# Patient Record
Sex: FEMALE | Race: OTHER | URBAN - METROPOLITAN AREA
[De-identification: names, ages, dates, MRNs, and addresses within clinical notes are randomized per-mention and may not be internally consistent; named-entity substitution may affect disease eponyms.]

---

## 2018-09-13 ENCOUNTER — APPOINTMENT (RX ONLY)
Dept: URBAN - METROPOLITAN AREA CLINIC 20 | Facility: CLINIC | Age: 28
Setting detail: DERMATOLOGY
End: 2018-09-13

## 2018-09-13 DIAGNOSIS — L71.8 OTHER ROSACEA: ICD-10-CM

## 2018-09-13 DIAGNOSIS — L70.0 ACNE VULGARIS: ICD-10-CM

## 2018-09-13 DIAGNOSIS — D22 MELANOCYTIC NEVI: ICD-10-CM

## 2018-09-13 PROBLEM — D22.5 MELANOCYTIC NEVI OF TRUNK: Status: ACTIVE | Noted: 2018-09-13

## 2018-09-13 PROCEDURE — ? PRESCRIPTION

## 2018-09-13 PROCEDURE — ? COUNSELING

## 2018-09-13 PROCEDURE — ? OBSERVATION AND MEASURE

## 2018-09-13 PROCEDURE — ? ADDITIONAL NOTES

## 2018-09-13 PROCEDURE — 99203 OFFICE O/P NEW LOW 30 MIN: CPT

## 2018-09-13 RX ORDER — AZELAIC ACID 0.15 G/G
AEROSOL, FOAM TOPICAL
Qty: 1 | Refills: 3 | Status: ERX | COMMUNITY
Start: 2018-09-13

## 2018-09-13 RX ADMIN — AZELAIC ACID: 0.15 AEROSOL, FOAM TOPICAL at 00:00

## 2018-09-13 ASSESSMENT — LOCATION SIMPLE DESCRIPTION DERM
LOCATION SIMPLE: LEFT CHEEK
LOCATION SIMPLE: LEFT UPPER BACK
LOCATION SIMPLE: RIGHT CHEEK

## 2018-09-13 ASSESSMENT — LOCATION ZONE DERM
LOCATION ZONE: FACE
LOCATION ZONE: TRUNK

## 2018-09-13 ASSESSMENT — LOCATION DETAILED DESCRIPTION DERM
LOCATION DETAILED: LEFT SUPERIOR UPPER BACK
LOCATION DETAILED: LEFT INFERIOR LATERAL MALAR CHEEK
LOCATION DETAILED: RIGHT LATERAL MANDIBULAR CHEEK
LOCATION DETAILED: LEFT SUPERIOR CENTRAL BUCCAL CHEEK
LOCATION DETAILED: RIGHT SUPERIOR MEDIAL BUCCAL CHEEK

## 2018-09-13 NOTE — HPI: RASH
How Severe Is Your Rash?: moderate
Is This A New Presentation, Or A Follow-Up?: Rash
Additional History: Previously used after rash to help;  [place] apricot scrub (-) , dial soap (-) , New Skin wash and lotion (-) , Charcoal mask (worsened)

## 2018-09-13 NOTE — PROCEDURE: ADDITIONAL NOTES
Additional Notes: Plan:\\n-Discontinue OTC products\\n-Return proactive \\n-start oral solodyn 55mg once a day for 2 months. *Samples provided\\n-recommended epionce lytic cleanser
Detail Level: Simple

## 2018-09-13 NOTE — PROCEDURE: COUNSELING
Detail Level: Simple
Tazorac Counseling:  Patient advised that medication is irritating and drying.  Patient may need to apply sparingly and wash off after an hour before eventually leaving it on overnight.  The patient verbalized understanding of the proper use and possible adverse effects of tazorac.  All of the patient's questions and concerns were addressed.
Birth Control Pills Counseling: Birth Control Pill Counseling: I discussed with the patient the potential side effects of OCPs including but not limited to increased risk of stroke, heart attack, thrombophlebitis, deep venous thrombosis, hepatic adenomas, breast changes, GI upset, headaches, and depression.  The patient verbalized understanding of the proper use and possible adverse effects of OCPs. All of the patient's questions and concerns were addressed.
Topical Retinoid counseling:  Patient advised to apply a pea-sized amount only at bedtime and wait 30 minutes after washing their face before applying.  If too drying, patient may add a non-comedogenic moisturizer. The patient verbalized understanding of the proper use and possible adverse effects of retinoids.  All of the patient's questions and concerns were addressed.
Azithromycin Pregnancy And Lactation Text: This medication is considered safe during pregnancy and is also secreted in breast milk.
Include Pregnancy/Lactation Warning?: No
High Dose Vitamin A Pregnancy And Lactation Text: High dose vitamin A therapy is contraindicated during pregnancy and breast feeding.
Bactrim Counseling:  I discussed with the patient the risks of sulfa antibiotics including but not limited to GI upset, allergic reaction, drug rash, diarrhea, dizziness, photosensitivity, and yeast infections.  Rarely, more serious reactions can occur including but not limited to aplastic anemia, agranulocytosis, methemoglobinemia, blood dyscrasias, liver or kidney failure, lung infiltrates or desquamative/blistering drug rashes.
Erythromycin Counseling:  I discussed with the patient the risks of erythromycin including but not limited to GI upset, allergic reaction, drug rash, diarrhea, increase in liver enzymes, and yeast infections.
Benzoyl Peroxide Counseling: Patient counseled that medicine may cause skin irritation and bleach clothing.  In the event of skin irritation, the patient was advised to reduce the amount of the drug applied or use it less frequently.   The patient verbalized understanding of the proper use and possible adverse effects of benzoyl peroxide.  All of the patient's questions and concerns were addressed.
Spironolactone Counseling: Patient advised regarding risks of diarrhea, abdominal pain, hyperkalemia, birth defects (for female patients), liver toxicity and renal toxicity. The patient may need blood work to monitor liver and kidney function and potassium levels while on therapy. The patient verbalized understanding of the proper use and possible adverse effects of spironolactone.  All of the patient's questions and concerns were addressed.
Minocycline Counseling: Patient advised regarding possible photosensitivity and discoloration of the teeth, skin, lips, tongue and gums.  Patient instructed to avoid sunlight, if possible.  When exposed to sunlight, patients should wear protective clothing, sunglasses, and sunscreen.  The patient was instructed to call the office immediately if the following severe adverse effects occur:  hearing changes, easy bruising/bleeding, severe headache, or vision changes.  The patient verbalized understanding of the proper use and possible adverse effects of minocycline.  All of the patient's questions and concerns were addressed.
Azithromycin Counseling:  I discussed with the patient the risks of azithromycin including but not limited to GI upset, allergic reaction, drug rash, diarrhea, and yeast infections.
Doxycycline Pregnancy And Lactation Text: This medication is Pregnancy Category D and not consider safe during pregnancy. It is also excreted in breast milk but is considered safe for shorter treatment courses.
Dapsone Pregnancy And Lactation Text: This medication is Pregnancy Category C and is not considered safe during pregnancy or breast feeding.
Tazorac Pregnancy And Lactation Text: This medication is not safe during pregnancy. It is unknown if this medication is excreted in breast milk.
High Dose Vitamin A Counseling: Side effects reviewed, pt to contact office should one occur.
Doxycycline Counseling:  Patient counseled regarding possible photosensitivity and increased risk for sunburn.  Patient instructed to avoid sunlight, if possible.  When exposed to sunlight, patients should wear protective clothing, sunglasses, and sunscreen.  The patient was instructed to call the office immediately if the following severe adverse effects occur:  hearing changes, easy bruising/bleeding, severe headache, or vision changes.  The patient verbalized understanding of the proper use and possible adverse effects of doxycycline.  All of the patient's questions and concerns were addressed.
Topical Retinoid Pregnancy And Lactation Text: This medication is Pregnancy Category C. It is unknown if this medication is excreted in breast milk.
Isotretinoin Counseling: Patient should get monthly blood tests, not donate blood, not drive at night if vision affected, not share medication, and not undergo elective surgery for 6 months after tx completed. Side effects reviewed, pt to contact office should one occur.
Birth Control Pills Pregnancy And Lactation Text: This medication should be avoided if pregnant and for the first 30 days post-partum.
Isotretinoin Pregnancy And Lactation Text: This medication is Pregnancy Category X and is considered extremely dangerous during pregnancy. It is unknown if it is excreted in breast milk.
Topical Clindamycin Counseling: Patient counseled that this medication may cause skin irritation or allergic reactions.  In the event of skin irritation, the patient was advised to reduce the amount of the drug applied or use it less frequently.   The patient verbalized understanding of the proper use and possible adverse effects of clindamycin.  All of the patient's questions and concerns were addressed.
Bactrim Pregnancy And Lactation Text: This medication is Pregnancy Category D and is known to cause fetal risk.  It is also excreted in breast milk.
Erythromycin Pregnancy And Lactation Text: This medication is Pregnancy Category B and is considered safe during pregnancy. It is also excreted in breast milk.
Spironolactone Pregnancy And Lactation Text: This medication can cause feminization of the male fetus and should be avoided during pregnancy. The active metabolite is also found in breast milk.
Tetracycline Pregnancy And Lactation Text: This medication is Pregnancy Category D and not consider safe during pregnancy. It is also excreted in breast milk.
Topical Clindamycin Pregnancy And Lactation Text: This medication is Pregnancy Category B and is considered safe during pregnancy. It is unknown if it is excreted in breast milk.
Dapsone Counseling: I discussed with the patient the risks of dapsone including but not limited to hemolytic anemia, agranulocytosis, rashes, methemoglobinemia, kidney failure, peripheral neuropathy, headaches, GI upset, and liver toxicity.  Patients who start dapsone require monitoring including baseline LFTs and weekly CBCs for the first month, then every month thereafter.  The patient verbalized understanding of the proper use and possible adverse effects of dapsone.  All of the patient's questions and concerns were addressed.
Tetracycline Counseling: Patient counseled regarding possible photosensitivity and increased risk for sunburn.  Patient instructed to avoid sunlight, if possible.  When exposed to sunlight, patients should wear protective clothing, sunglasses, and sunscreen.  The patient was instructed to call the office immediately if the following severe adverse effects occur:  hearing changes, easy bruising/bleeding, severe headache, or vision changes.  The patient verbalized understanding of the proper use and possible adverse effects of tetracycline.  All of the patient's questions and concerns were addressed. Patient understands to avoid pregnancy while on therapy due to potential birth defects.
Topical Sulfur Applications Counseling: Topical Sulfur Counseling: Patient counseled that this medication may cause skin irritation or allergic reactions.  In the event of skin irritation, the patient was advised to reduce the amount of the drug applied or use it less frequently.   The patient verbalized understanding of the proper use and possible adverse effects of topical sulfur application.  All of the patient's questions and concerns were addressed.
Topical Sulfur Applications Pregnancy And Lactation Text: This medication is Pregnancy Category C and has an unknown safety profile during pregnancy. It is unknown if this topical medication is excreted in breast milk.
Benzoyl Peroxide Pregnancy And Lactation Text: This medication is Pregnancy Category C. It is unknown if benzoyl peroxide is excreted in breast milk.

## 2018-12-13 ENCOUNTER — APPOINTMENT (RX ONLY)
Dept: URBAN - METROPOLITAN AREA CLINIC 20 | Facility: CLINIC | Age: 28
Setting detail: DERMATOLOGY
End: 2018-12-13

## 2020-12-31 LAB
ABO GROUP BLD: NORMAL
BLD GP AB SCN SERPL QL: NORMAL
C TRACH DNA GENITAL QL NAA+PROBE: NORMAL
GLUCOSE 1H P 50 G GLC PO SERPL-MCNC: 124 MG/DL
HBV SURFACE AG SERPL QL IA: NORMAL
HIV 1+2 AB+HIV1 P24 AG SERPL QL IA: NORMAL
N GONORRHOEA DNA GENITAL QL NAA+PROBE: NORMAL
RUBV IGG SERPL IA-ACNC: NORMAL
TREPONEMA PALLIDUM IGG+IGM AB [PRESENCE] IN SERUM OR PLASMA BY IMMUNOASSAY: NORMAL

## 2021-05-17 ENCOUNTER — HOSPITAL ENCOUNTER (EMERGENCY)
Facility: MEDICAL CENTER | Age: 31
End: 2021-05-17
Attending: OBSTETRICS & GYNECOLOGY | Admitting: OBSTETRICS & GYNECOLOGY
Payer: COMMERCIAL

## 2021-05-17 ENCOUNTER — APPOINTMENT (OUTPATIENT)
Dept: RADIOLOGY | Facility: MEDICAL CENTER | Age: 31
End: 2021-05-17
Attending: OBSTETRICS & GYNECOLOGY
Payer: COMMERCIAL

## 2021-05-17 VITALS
WEIGHT: 175 LBS | HEIGHT: 68 IN | OXYGEN SATURATION: 97 % | RESPIRATION RATE: 18 BRPM | BODY MASS INDEX: 26.52 KG/M2 | TEMPERATURE: 97.9 F | HEART RATE: 99 BPM | SYSTOLIC BLOOD PRESSURE: 109 MMHG | DIASTOLIC BLOOD PRESSURE: 68 MMHG

## 2021-05-17 PROCEDURE — 302449 STATCHG TRIAGE ONLY (STATISTIC)

## 2021-05-17 PROCEDURE — 59025 FETAL NON-STRESS TEST: CPT

## 2021-05-17 PROCEDURE — 76815 OB US LIMITED FETUS(S): CPT

## 2021-05-17 NOTE — DISCHARGE INSTRUCTIONS
Activity Restriction During Pregnancy  Your health care provider may recommend specific activity restrictions during pregnancy for a variety of reasons. Activity restriction may require that you limit activities that require great effort, such as exercise, lifting, or sex.  The type of activity restriction will vary for each person, depending on your risk or the problems you are having. Activity restriction may be recommended for a period of time until your baby is delivered.  Why are activity restrictions recommended?  Activity restriction may be recommended if:  · Your placenta is partially or completely covering the opening of your cervix (placenta previa).  · There is bleeding between the wall of the uterus and the amniotic sac in the first trimester of pregnancy (subchorionic hemorrhage).  · You went into labor too early ( labor).  · You have a history of miscarriage.  · You have a condition that causes high blood pressure during pregnancy (preeclampsia or eclampsia).  · You are pregnant with more than one baby.  · Your baby is not growing well.  What are the risks?  The risks depend on your specific restriction. Strict bed rest has the most physical and emotional risks and is no longer routinely recommended. Risks of strict bed rest include:  · Loss of muscle conditioning from not moving.  · Blood clots.  · Social isolation.  · Depression.  · Loss of income.  Talk with your health care team about activity restriction to decide if it is best for you and your baby. Even if you are having problems during your pregnancy, you may be able to continue with normal levels of activity with careful monitoring by your health care team.  Follow these instructions at home:  If needed, based on your overall health and the health of your baby, your health care provider will decide which type of activity restriction is right for you. Activity restrictions may include:  · Not lifting anything heavier than 10 pounds (4.5  kg).  · Avoiding activities that take a lot of physical effort.  · No lifting or straining.  · Resting in a sitting position or lying down for periods of time during the day.  Pelvic rest may be recommended along with activity restrictions. If pelvic rest is recommended, then:  · Do not have sex, an orgasm, or use sexual stimulation.  · Do not use tampons. Do not douche. Do not put anything into your vagina.  · Do not lift anything that is heavier than 10 lb (4.5 kg).  · Avoid activities that require a lot of effort.  · Avoid any activity in which your pelvic muscles could become strained, such as squatting.  Questions to ask your health care provider  · Why is my activity being limited?  · How will activity restrictions affect my body?  · Why is rest helpful for me and my baby?  · What activities can I do?  · When can I return to normal activities?  When should I seek immediate medical care?  Seek immediate medical care if you have:  · Vaginal bleeding.  · Vaginal discharge.  · Cramping pain in your lower abdomen.  · Regular contractions.  · A low, dull backache.  Summary  · Your health care provider may recommend specific activity restrictions during pregnancy for a variety of reasons.  · Activity restriction may require that you limit activities such as exercise, lifting, sex, or any other activity that requires great effort.  · Discuss the risks and benefits of activity restriction with your health care team to decide if it is best for you and your baby.  · Contact your health care provider right away if you think you are having contractions, or if you notice vaginal bleeding, discharge, or cramping.  This information is not intended to replace advice given to you by your health care provider. Make sure you discuss any questions you have with your health care provider.  Document Released: 04/13/2012 Document Revised: 04/09/2019 Document Reviewed: 04/09/2019  Elsevier Patient Education © 2020 Elsevier  Inc[employer]   Labor and Birth Information  Pregnancy normally lasts 39-41 weeks.  labor is when labor starts early. It starts before you have been pregnant for 37 whole weeks.  What are the risk factors for  labor?   labor is more likely to occur in women who:  · Have an infection while pregnant.  · Have a cervix that is short.  · Have gone into  labor before.  · Have had surgery on their cervix.  · Are younger than age 17.  · Are older than age 35.  · Are .  · Are pregnant with two or more babies.  · Take street drugs while pregnant.  · Smoke while pregnant.  · Do not gain enough weight while pregnant.  · Got pregnant right after another pregnancy.  What are the symptoms of  labor?  Symptoms of  labor include:  · Cramps. The cramps may feel like the cramps some women get during their period. The cramps may happen with watery poop (diarrhea).  · Pain in the belly (abdomen).  · Pain in the lower back.  · Regular contractions or tightening. It may feel like your belly is getting tighter.  · Pressure in the lower belly that seems to get stronger.  · More fluid (discharge) leaking from the vagina. The fluid may be watery or bloody.  · Water breaking.  Why is it important to notice signs of  labor?  Babies who are born early may not be fully developed. They have a higher chance for:  · Long-term heart problems.  · Long-term lung problems.  · Trouble controlling body systems, like breathing.  · Bleeding in the brain.  · A condition called cerebral palsy.  · Learning difficulties.  · Death.  These risks are highest for babies who are born before 34 weeks of pregnancy.  How is  labor treated?  Treatment depends on:  · How long you were pregnant.  · Your condition.  · The health of your baby.  Treatment may involve:  · Having a stitch (suture) placed in your cervix. When you give birth, your cervix opens so the baby can come out. The stitch keeps the  cervix from opening too soon.  · Staying at the hospital.  · Taking or getting medicines, such as:  ? Hormone medicines.  ? Medicines to stop contractions.  ? Medicines to help the baby’s lungs develop.  ? Medicines to prevent your baby from having cerebral palsy.  What should I do if I am in  labor?  If you think you are going into labor too soon, call your doctor right away.  How can I prevent  labor?  · Do not use any tobacco products.  ? Examples of these are cigarettes, chewing tobacco, and e-cigarettes.  ? If you need help quitting, ask your doctor.  · Do not use street drugs.  · Do not use any medicines unless you ask your doctor if they are safe for you.  · Talk with your doctor before taking any herbal supplements.  · Make sure you gain enough weight.  · Watch for infection. If you think you might have an infection, get it checked right away.  · If you have gone into  labor before, tell your doctor.  This information is not intended to replace advice given to you by your health care provider. Make sure you discuss any questions you have with your health care provider.  Document Released: 2010 Document Revised: 04/10/2020 Document Reviewed: 05/10/2017  Elsevier Patient Education ©  Elsevier Inc.

## 2021-05-17 NOTE — PROGRESS NOTES
Report from Kathryn Galdamez. POC discussed, assumed pt care.     33.0 here for vaginal bleeding, post intercourse.   Report to Dr. Castro. Order for US with BHASKAR.   US here. BHASKAR 16 cm. Baby vertex, fundal placenta. Cervical length 4.7 cm cervix closed.    Bleeding has stopped since being here at the hospital. REport to Dr. Castro. Discharge order received. Pt has f/u appointment with Matthew on Wednesday. Pelvic rest until cleared by MD.Discharge teaching performed. PT and FOB verbalized understanding. Discharged to home, ambulatory.

## 2021-05-17 NOTE — PROGRESS NOTES
0630 - 31 y/o  EDC 21, EGA 33.0, here to LDA 3 with , Brady. C/O vaginal bleeding around 0500 this morning following intercourse early this morning. Initially she discovered blood on the sheets, then had multiple golf ball sized clots dislodged, now she is having watery bloody discharge. She reports having mild cramping for a short period of time following the bleed but this has subsided. EFM/TOCO applied, Patient states positive FM. VSS.  0700 - Report given to MATEO Barros RN. All questions answered.

## 2021-06-05 LAB — GP B STREP DNA SPEC QL NAA+PROBE: NORMAL

## 2021-07-03 ENCOUNTER — HOSPITAL ENCOUNTER (OUTPATIENT)
Dept: OBGYN | Facility: MEDICAL CENTER | Age: 31
End: 2021-07-03
Attending: OBSTETRICS & GYNECOLOGY
Payer: COMMERCIAL

## 2021-07-03 PROCEDURE — U0003 INFECTIOUS AGENT DETECTION BY NUCLEIC ACID (DNA OR RNA); SEVERE ACUTE RESPIRATORY SYNDROME CORONAVIRUS 2 (SARS-COV-2) (CORONAVIRUS DISEASE [COVID-19]), AMPLIFIED PROBE TECHNIQUE, MAKING USE OF HIGH THROUGHPUT TECHNOLOGIES AS DESCRIBED BY CMS-2020-01-R: HCPCS

## 2021-07-03 PROCEDURE — U0005 INFEC AGEN DETEC AMPLI PROBE: HCPCS

## 2021-07-03 NOTE — PROGRESS NOTES
1209- COVID swab collected.  Self isolation precautions given.  Pt home ambulatory in stable condition with FOB at side.

## 2021-07-04 LAB
SARS-COV-2 RNA RESP QL NAA+PROBE: NOTDETECTED
SPECIMEN SOURCE: NORMAL

## 2021-07-05 ENCOUNTER — HOSPITAL ENCOUNTER (INPATIENT)
Facility: MEDICAL CENTER | Age: 31
LOS: 1 days | End: 2021-07-06
Attending: OBSTETRICS & GYNECOLOGY | Admitting: OBSTETRICS & GYNECOLOGY
Payer: COMMERCIAL

## 2021-07-05 LAB
BASOPHILS # BLD AUTO: 0.3 % (ref 0–1.8)
BASOPHILS # BLD: 0.03 K/UL (ref 0–0.12)
EOSINOPHIL # BLD AUTO: 0.07 K/UL (ref 0–0.51)
EOSINOPHIL NFR BLD: 0.7 % (ref 0–6.9)
ERYTHROCYTE [DISTWIDTH] IN BLOOD BY AUTOMATED COUNT: 45.9 FL (ref 35.9–50)
ERYTHROCYTE [DISTWIDTH] IN BLOOD BY AUTOMATED COUNT: 46.7 FL (ref 35.9–50)
HCT VFR BLD AUTO: 31.1 % (ref 37–47)
HCT VFR BLD AUTO: 33.2 % (ref 37–47)
HGB BLD-MCNC: 10 G/DL (ref 12–16)
HGB BLD-MCNC: 10.9 G/DL (ref 12–16)
HOLDING TUBE BB 8507: NORMAL
IMM GRANULOCYTES # BLD AUTO: 0.06 K/UL (ref 0–0.11)
IMM GRANULOCYTES NFR BLD AUTO: 0.6 % (ref 0–0.9)
LYMPHOCYTES # BLD AUTO: 1.81 K/UL (ref 1–4.8)
LYMPHOCYTES NFR BLD: 19 % (ref 22–41)
MCH RBC QN AUTO: 30.3 PG (ref 27–33)
MCH RBC QN AUTO: 31.1 PG (ref 27–33)
MCHC RBC AUTO-ENTMCNC: 32.2 G/DL (ref 33.6–35)
MCHC RBC AUTO-ENTMCNC: 32.8 G/DL (ref 33.6–35)
MCV RBC AUTO: 94.2 FL (ref 81.4–97.8)
MCV RBC AUTO: 94.9 FL (ref 81.4–97.8)
MONOCYTES # BLD AUTO: 0.55 K/UL (ref 0–0.85)
MONOCYTES NFR BLD AUTO: 5.8 % (ref 0–13.4)
NEUTROPHILS # BLD AUTO: 7.03 K/UL (ref 2–7.15)
NEUTROPHILS NFR BLD: 73.6 % (ref 44–72)
NRBC # BLD AUTO: 0 K/UL
NRBC BLD-RTO: 0 /100 WBC
NUMBER OF RH DOSES IND 8505RD: NORMAL
PLATELET # BLD AUTO: 193 K/UL (ref 164–446)
PLATELET # BLD AUTO: 198 K/UL (ref 164–446)
PMV BLD AUTO: 10.9 FL (ref 9–12.9)
PMV BLD AUTO: 11.2 FL (ref 9–12.9)
RBC # BLD AUTO: 3.3 M/UL (ref 4.2–5.4)
RBC # BLD AUTO: 3.5 M/UL (ref 4.2–5.4)
RH BLD: NORMAL
WBC # BLD AUTO: 14.9 K/UL (ref 4.8–10.8)
WBC # BLD AUTO: 9.6 K/UL (ref 4.8–10.8)

## 2021-07-05 PROCEDURE — 36415 COLL VENOUS BLD VENIPUNCTURE: CPT

## 2021-07-05 PROCEDURE — 304965 HCHG RECOVERY SERVICES

## 2021-07-05 PROCEDURE — 0UQMXZZ REPAIR VULVA, EXTERNAL APPROACH: ICD-10-PCS | Performed by: OBSTETRICS & GYNECOLOGY

## 2021-07-05 PROCEDURE — 770002 HCHG ROOM/CARE - OB PRIVATE (112)

## 2021-07-05 PROCEDURE — 700111 HCHG RX REV CODE 636 W/ 250 OVERRIDE (IP): Performed by: OBSTETRICS & GYNECOLOGY

## 2021-07-05 PROCEDURE — 700105 HCHG RX REV CODE 258: Performed by: OBSTETRICS & GYNECOLOGY

## 2021-07-05 PROCEDURE — 85025 COMPLETE CBC W/AUTO DIFF WBC: CPT

## 2021-07-05 PROCEDURE — 85027 COMPLETE CBC AUTOMATED: CPT

## 2021-07-05 PROCEDURE — 0HQ9XZZ REPAIR PERINEUM SKIN, EXTERNAL APPROACH: ICD-10-PCS | Performed by: OBSTETRICS & GYNECOLOGY

## 2021-07-05 PROCEDURE — 59409 OBSTETRICAL CARE: CPT

## 2021-07-05 PROCEDURE — 86901 BLOOD TYPING SEROLOGIC RH(D): CPT

## 2021-07-05 PROCEDURE — A9270 NON-COVERED ITEM OR SERVICE: HCPCS | Performed by: OBSTETRICS & GYNECOLOGY

## 2021-07-05 PROCEDURE — 700102 HCHG RX REV CODE 250 W/ 637 OVERRIDE(OP): Performed by: OBSTETRICS & GYNECOLOGY

## 2021-07-05 RX ORDER — SODIUM CHLORIDE, SODIUM LACTATE, POTASSIUM CHLORIDE, CALCIUM CHLORIDE 600; 310; 30; 20 MG/100ML; MG/100ML; MG/100ML; MG/100ML
INJECTION, SOLUTION INTRAVENOUS CONTINUOUS
Status: DISCONTINUED | OUTPATIENT
Start: 2021-07-05 | End: 2021-07-06 | Stop reason: HOSPADM

## 2021-07-05 RX ORDER — ONDANSETRON 4 MG/1
4 TABLET, ORALLY DISINTEGRATING ORAL EVERY 6 HOURS PRN
Status: DISCONTINUED | OUTPATIENT
Start: 2021-07-05 | End: 2021-07-06 | Stop reason: HOSPADM

## 2021-07-05 RX ORDER — MISOPROSTOL 200 UG/1
800 TABLET ORAL
Status: DISCONTINUED | OUTPATIENT
Start: 2021-07-05 | End: 2021-07-06 | Stop reason: HOSPADM

## 2021-07-05 RX ORDER — METHYLERGONOVINE MALEATE 0.2 MG/ML
0.2 INJECTION INTRAVENOUS
Status: DISCONTINUED | OUTPATIENT
Start: 2021-07-05 | End: 2021-07-06 | Stop reason: HOSPADM

## 2021-07-05 RX ORDER — ONDANSETRON 2 MG/ML
4 INJECTION INTRAMUSCULAR; INTRAVENOUS EVERY 6 HOURS PRN
Status: DISCONTINUED | OUTPATIENT
Start: 2021-07-05 | End: 2021-07-06 | Stop reason: HOSPADM

## 2021-07-05 RX ORDER — MISOPROSTOL 200 UG/1
1000 TABLET ORAL
Status: DISCONTINUED | OUTPATIENT
Start: 2021-07-05 | End: 2021-07-05 | Stop reason: HOSPADM

## 2021-07-05 RX ORDER — IBUPROFEN 600 MG/1
600 TABLET ORAL EVERY 6 HOURS PRN
Status: DISCONTINUED | OUTPATIENT
Start: 2021-07-05 | End: 2021-07-06 | Stop reason: HOSPADM

## 2021-07-05 RX ORDER — SODIUM CHLORIDE, SODIUM LACTATE, POTASSIUM CHLORIDE, CALCIUM CHLORIDE 600; 310; 30; 20 MG/100ML; MG/100ML; MG/100ML; MG/100ML
INJECTION, SOLUTION INTRAVENOUS PRN
Status: DISCONTINUED | OUTPATIENT
Start: 2021-07-05 | End: 2021-07-06 | Stop reason: HOSPADM

## 2021-07-05 RX ORDER — LIDOCAINE HYDROCHLORIDE 10 MG/ML
INJECTION, SOLUTION INFILTRATION; PERINEURAL
Status: ACTIVE
Start: 2021-07-05 | End: 2021-07-06

## 2021-07-05 RX ORDER — ACETAMINOPHEN 325 MG/1
325 TABLET ORAL EVERY 4 HOURS PRN
Status: DISCONTINUED | OUTPATIENT
Start: 2021-07-05 | End: 2021-07-06 | Stop reason: HOSPADM

## 2021-07-05 RX ORDER — OXYCODONE HYDROCHLORIDE AND ACETAMINOPHEN 5; 325 MG/1; MG/1
2 TABLET ORAL EVERY 4 HOURS PRN
Status: DISCONTINUED | OUTPATIENT
Start: 2021-07-05 | End: 2021-07-06 | Stop reason: HOSPADM

## 2021-07-05 RX ORDER — DOCUSATE SODIUM 100 MG/1
100 CAPSULE, LIQUID FILLED ORAL 2 TIMES DAILY PRN
Status: DISCONTINUED | OUTPATIENT
Start: 2021-07-05 | End: 2021-07-06 | Stop reason: HOSPADM

## 2021-07-05 RX ORDER — VITAMIN A ACETATE, BETA CAROTENE, ASCORBIC ACID, CHOLECALCIFEROL, .ALPHA.-TOCOPHEROL ACETATE, DL-, THIAMINE MONONITRATE, RIBOFLAVIN, NIACINAMIDE, PYRIDOXINE HYDROCHLORIDE, FOLIC ACID, CYANOCOBALAMIN, CALCIUM CARBONATE, FERROUS FUMARATE, ZINC OXIDE, CUPRIC OXIDE 3080; 12; 120; 400; 1; 1.84; 3; 20; 22; 920; 25; 200; 27; 10; 2 [IU]/1; UG/1; MG/1; [IU]/1; MG/1; MG/1; MG/1; MG/1; MG/1; [IU]/1; MG/1; MG/1; MG/1; MG/1; MG/1
1 TABLET, FILM COATED ORAL
Status: DISCONTINUED | OUTPATIENT
Start: 2021-07-05 | End: 2021-07-06 | Stop reason: HOSPADM

## 2021-07-05 RX ADMIN — IBUPROFEN 600 MG: 600 TABLET, FILM COATED ORAL at 17:34

## 2021-07-05 RX ADMIN — ONDANSETRON 4 MG: 2 INJECTION INTRAMUSCULAR; INTRAVENOUS at 11:13

## 2021-07-05 RX ADMIN — OXYTOCIN 2 MILLI-UNITS/MIN: 10 INJECTION, SOLUTION INTRAMUSCULAR; INTRAVENOUS at 05:23

## 2021-07-05 RX ADMIN — SODIUM CHLORIDE, POTASSIUM CHLORIDE, SODIUM LACTATE AND CALCIUM CHLORIDE: 600; 310; 30; 20 INJECTION, SOLUTION INTRAVENOUS at 05:23

## 2021-07-05 RX ADMIN — OXYTOCIN 125 ML/HR: 10 INJECTION, SOLUTION INTRAMUSCULAR; INTRAVENOUS at 14:14

## 2021-07-05 ASSESSMENT — PATIENT HEALTH QUESTIONNAIRE - PHQ9
SUM OF ALL RESPONSES TO PHQ QUESTIONS 1-9: 6
7. TROUBLE CONCENTRATING ON THINGS, SUCH AS READING THE NEWSPAPER OR WATCHING TELEVISION: NOT AT ALL
9. THOUGHTS THAT YOU WOULD BE BETTER OFF DEAD, OR OF HURTING YOURSELF: NOT AT ALL
4. FEELING TIRED OR HAVING LITTLE ENERGY: MORE THAN HALF THE DAYS
8. MOVING OR SPEAKING SO SLOWLY THAT OTHER PEOPLE COULD HAVE NOTICED. OR THE OPPOSITE, BEING SO FIGETY OR RESTLESS THAT YOU HAVE BEEN MOVING AROUND A LOT MORE THAN USUAL: NOT AT ALL
3. TROUBLE FALLING OR STAYING ASLEEP OR SLEEPING TOO MUCH: NEARLY EVERY DAY
5. POOR APPETITE OR OVEREATING: NOT AT ALL
1. LITTLE INTEREST OR PLEASURE IN DOING THINGS: SEVERAL DAYS
SUM OF ALL RESPONSES TO PHQ9 QUESTIONS 1 AND 2: 1
6. FEELING BAD ABOUT YOURSELF - OR THAT YOU ARE A FAILURE OR HAVE LET YOURSELF OR YOUR FAMILY DOWN: NOT AL ALL
2. FEELING DOWN, DEPRESSED, IRRITABLE, OR HOPELESS: NOT AT ALL

## 2021-07-05 ASSESSMENT — PAIN DESCRIPTION - PAIN TYPE
TYPE: ACUTE PAIN

## 2021-07-05 ASSESSMENT — COPD QUESTIONNAIRES
DO YOU EVER COUGH UP ANY MUCUS OR PHLEGM?: NO/ONLY WITH OCCASIONAL COLDS OR INFECTIONS
COPD SCREENING SCORE: 0
DURING THE PAST 4 WEEKS HOW MUCH DID YOU FEEL SHORT OF BREATH: NONE/LITTLE OF THE TIME
IN THE PAST 12 MONTHS DO YOU DO LESS THAN YOU USED TO BECAUSE OF YOUR BREATHING PROBLEMS: DISAGREE/UNSURE
HAVE YOU SMOKED AT LEAST 100 CIGARETTES IN YOUR ENTIRE LIFE: NO/DON'T KNOW

## 2021-07-05 ASSESSMENT — LIFESTYLE VARIABLES
DOES PATIENT WANT TO STOP DRINKING: NO
ALCOHOL_USE: NO
EVER_SMOKED: NEVER

## 2021-07-05 NOTE — PROGRESS NOTES
"0700 - Report received, care assumed. Harrell Gestation today at 40.0 Weeks      Patient is in bed sleeping, awake briefly to RN at the BS. FOB \"Lizandro\" at BS.     Patient would like the FOB at BS during the pushing and delivery phase.  Patient would like immediate skin/skin at delivery.   Once the cord has stopped pulsating, the FOB would like to cut the cord.   Planning to breastfeed with DBM if necessary for supplementation    Reports little sleep last night; SROM at 2330 then admission to the hospital.     Denies ill feeling, reports FM. No sudden change to vision/edema/HA; states she has been getting up to the BR herself without assist, denies dizziness or weakness.     Use of FM/[place] discussed and in place, discussed POC - ongoing as needed. Pleasant/sleepy affect/mood. Review of labor process/expectations, breathing/relaxation techniques - TBD as needed. Discussed pain management options - patient is interested in labor and delivery without pain medications. Reports her last delivery was unmediated as well, albeit precipitous.      Patient denies questions/concerns regarding care since arrival to [facility].   RN contact information updated on the dry erase board, discussed.   Patient encouraged to call RN with all questions/concerns/needs.  "

## 2021-07-05 NOTE — H&P
DATE OF ADMISSION:  2021     ADMISSION DIAGNOSES:  1.  Intrauterine pregnancy at 40+0 weeks' gestation.  2.  S-ROM clear fluid on 2021 at 2300 hours.  3.  GBS negative.  4.  Rh negative.  5.  History of precipitous delivery 9 years ago.  6.  Active labor.     HISTORY OF PRESENT ILLNESS:  The patient is a 30-year-old  3, para   1-0-1-1 at 40+0 weeks' gestation based upon her LMP of 2020 which is   consistent with a 9+0 week ultrasound performed on 2020 who presents to   labor and delivery with report of spontaneous rupture of membranes of clear   fluid on 2021 at 2300 hours.  At the time of admission, she was noted to   be ruptured with clear fluid.  Her cervix was 4 cm, 80% effaced, -1 station.    The patient was jesus irregularly.  Category 1 tracing.     Prenatal care with Dr. Angelita Castro, first visit at 9 weeks' gestation,   total visits 13, total weight gain 32 pounds.  Third trimester blood pressures   129-142/68-77.     PRENATAL LABS:  GBS negative on 2021.  One-hour , RPR   nonreactive.  Blood type A negative, antibody screen negative, rubella immune,   hepatitis B surface antigen negative, HIV negative, urine culture negative.    Pap negative for intraepithelial lesion or malignancy.  GC chlamydia negative,   negative.     OBSTETRIC ULTRASOUND:  1.  On 2020 at 9 weeks' gestation, REY 2021.  2.  On 2020 at 21+0 weeks' gestation, REY 2021, breech   presentation, posterior placenta, no previa, normal BHASKAR.  Female fetus, size   equal dates.  Cervix 3.6 cm.     PAST OBSTETRIC HISTORY:   1.  On 2012 at 40 weeks' gestation, 7 pound 3 ounce female infant via    precipitous delivery.  2.  In , 6 weeks' gestation medical termination.     PAST SURGICAL HISTORY:  Right hip surgery, wisdom tooth extraction.     PAST MEDICAL HISTORY:  Rh negative celiac disease.     ALLERGIES:  No known drug allergies.     MEDICATIONS:   Prenatal vitamins.     SOCIAL HISTORY:  She is .  She denies alcohol, tobacco, or drugs of   abuse.     PHYSICAL EXAMINATION:    VITAL SIGNS:  Stable.  She is afebrile.  GENERAL:  Alert, awake, and oriented x3, in no acute distress.  ABDOMEN:  Gravid, vertex by Leopold's, estimated fetal weight 3200 grams.    [facility], she is jesus irregularly, external fetal monitoring category 1   tracing, reactive, without decels.  PELVIC:  Sterile vaginal exam on admission 4 cm, 80% effaced, -2 station.     LABORATORY DATA:  On admission are pending.     ASSESSMENT AND PLAN:  A 30-year-old  3, para 1-0-1-1, at 40+0 weeks'   gestation based upon her LMP, which is consistent with a 9-week ultrasound,   who presents to labor and delivery with spontaneous rupture of membranes of   clear fluid at 2300 hours on 2021 who is in active labor, GBS negative   and Rh negative.     PLAN:  The patient will be admitted to labor and delivery.  She may have an   epidural for labor analgesia.  We will start IV Pitocin per protocol for labor   augmentation and anticipate a normal spontaneous vaginal delivery.        ______________________________  Sumaya Oh MD    HTA/LETICIA    DD:  2021 11:23  DT:  2021 12:00    Job#:  [id number]    CC:BRANDON HOPE MD

## 2021-07-05 NOTE — PROGRESS NOTES
TRANSFERRED FROM L&D TO POSTPARTUM VIA WHEELCHAIR. AMBULATES WITH STEADY GAIT. FUNDUS firm @u/1  LOCHIA light rubra. IVpatent left arm without redness or swelling. ORIENTED TO UNIT PROCEDURES AND INFANT SAFETY. PAIN . ENC TO CALL WITH NEEDS

## 2021-07-05 NOTE — CARE PLAN
The patient is Stable - Low risk of patient condition declining or worsening         Progress made toward(s) clinical / shift goals:    Problem: Risk for Injury  Goal: Patient and fetus will be free of preventable injury/complications  Outcome: Progressing  Note: Pt. And fetus to remain injury free during admission. Safety protocols discussed with pt. And family. Pt. And family agree to safety protocols at this time.      Problem: Pain  Goal: Patient's pain will be alleviated or reduced to the patient’s comfort goal  Outcome: Progressing  Note: Pt. Goal for pain to maintain at manageable level during admission. Pt. Desires unmedicated delivery. Coping techniques discussed with pt. And family.

## 2021-07-05 NOTE — FLOWSHEET NOTE
0045: Pt. Presents to labor and delivery with reports of SROM at home at 2330 with large amounts of clear fluid. Hx reviewed. RN visualizes amniotic fluid on chux pad and perineum.   0101: MD Loan contacted via telephone. Report given. Admission orders received. MD reports she will present to facility.   0253: Pt. Ambulating hallway with FOB at side   0302: Pt returned to labor room. Pt. Laboring on birth ball at BS.   0700: Report given to ROSALIND Sibley. POC discussed, all cares relinquished at this time.

## 2021-07-05 NOTE — L&D DELIVERY NOTE
DATE OF SERVICE:  2021     PROCEDURES:    1.  Spontaneous vaginal delivery.  2.  Repair of first-degree perineal laceration and left periurethral   laceration.     OBSTETRICIAN:  Johan Lopez MD     DIAGNOSES:    1.  A 40-week gestation, delivered.  2.  Prelabor rupture of membranes.  3.  Labor.     COMPLICATIONS:  None.     ESTIMATED BLOOD LOSS:  200 mL     FINDINGS:  Baby--- female, one minute Apgar 8, five minute Apgar 9.  Weight---3505 grams     BRIEF HISTORY:  This 30-year-old lady is now .  She presented on her due   date with prelabor rupture of membranes and subsequent spontaneous onset of   contractions.  She was 4 cm dilated on admission.  The amniotic fluid was   clear.  Fetal monitoring was reassuring.  She did not request epidural.  She   progressed appropriately.  Second stage was very short.  She only pushed one   time.  She pushed the baby out occiput anterior with no episiotomy.  I bulb   suctioned the baby's mouth and nares.  There were no nuchal cords.  The   shoulders and body delivered easily.  I placed the baby on her chest and 3   minutes later, I doubly clamped and cut the cord.  The placenta and all   attached membranes delivered spontaneously in a timely fashion.  There was a   3-vessel cord with central insertion.  The patient sustained a small midline   first-degree perineal laceration and a left periurethral laceration.  The   urethra was intact.  1% lidocaine was administered locally.  The wounds were   irrigated with Betadine solution.  I closed both wounds with 3-0 Vicryl.  The   first-degree wound was closed with running suture and the left periurethral   laceration was closed with a single suture.  Sponge and needle counts were   correct.        ______________________________  MD TEX Boyd/KRDANIEL    DD:  2021 13:28  DT:  2021 13:43    Job#:  [id number]    CC:BRANDON HOPE MD

## 2021-07-05 NOTE — L&D DELIVERY NOTE
(dictated)    Very short 2nd stage (one push)  Baby: female, APGARs 8-9, not weighed yet  plac spont,  cc  No epis  Lac: 1st degree perineal, L periurethral, 1% lidocaine, 3-0 vicryl    PROM latency 14 hrs, afebrile, GBS neg

## 2021-07-05 NOTE — H&P
"ADMISSION H AND P (DICTATED)    ADMISSION DIAGNOSIS:    1.  IUP AT 40W0D.  2.  SROM clear fluid at 2330 on 2021.  3.  GBS negative.  4.  Active labor.    PLAN:    1.  Admit to L and D.  2.  Will start IV pitocin for labor augmentation.  3.  Ok for epidural.  4.  Anticipate .    Recent Labs     21  0135   WBC 9.6   RBC 3.50*   HEMOGLOBIN 10.9*   HEMATOCRIT 33.2*   MCV 94.9   MCH 31.1   RDW 46.7   PLATELETCT 198   MPV 10.9   NEUTSPOLYS 73.60*   LYMPHOCYTES 19.00*   MONOCYTES 5.80   EOSINOPHILS 0.70   BASOPHILS 0.30     /70   Pulse 83   Temp 36.3 °C (97.4 °F) (Temporal)   Resp 16   Ht 1.727 m (5' 8\")   Wt 85.3 kg (188 lb)     A, A, and O x 3 NAD    Gravid    Vertex    EFW: 3200 grams\    TOCO: every 3-5 minutes    EFM: category I tracing.  Reactive and without decelerations.    A/P: 29 yo  at 40w0d who presents with SROM clear fluid and who is GBS negative and in active labor.    1.  Admit to L and D.  2.  Will start IV pitocin for labor augmentation.  3.  Ok for epidural.  4.  Anticipate .  5.  History of precipitous delivery.  6.  Will sign out to Dr. Lopez for continued care of this patient.    "

## 2021-07-06 VITALS
RESPIRATION RATE: 18 BRPM | HEIGHT: 68 IN | SYSTOLIC BLOOD PRESSURE: 128 MMHG | DIASTOLIC BLOOD PRESSURE: 76 MMHG | HEART RATE: 88 BPM | OXYGEN SATURATION: 97 % | BODY MASS INDEX: 28.49 KG/M2 | TEMPERATURE: 97.1 F | WEIGHT: 188 LBS

## 2021-07-06 PROCEDURE — A9270 NON-COVERED ITEM OR SERVICE: HCPCS | Performed by: OBSTETRICS & GYNECOLOGY

## 2021-07-06 PROCEDURE — 700102 HCHG RX REV CODE 250 W/ 637 OVERRIDE(OP): Performed by: OBSTETRICS & GYNECOLOGY

## 2021-07-06 RX ORDER — PSEUDOEPHEDRINE HCL 30 MG
100 TABLET ORAL 2 TIMES DAILY PRN
Qty: 60 CAPSULE | COMMUNITY
Start: 2021-07-06 | End: 2021-11-14

## 2021-07-06 RX ORDER — IBUPROFEN 200 MG
200-600 TABLET ORAL EVERY 6 HOURS PRN
COMMUNITY
Start: 2021-07-06 | End: 2021-11-14

## 2021-07-06 RX ORDER — VITAMIN A ACETATE, BETA CAROTENE, ASCORBIC ACID, CHOLECALCIFEROL, .ALPHA.-TOCOPHEROL ACETATE, DL-, THIAMINE MONONITRATE, RIBOFLAVIN, NIACINAMIDE, PYRIDOXINE HYDROCHLORIDE, FOLIC ACID, CYANOCOBALAMIN, CALCIUM CARBONATE, FERROUS FUMARATE, ZINC OXIDE, CUPRIC OXIDE 3080; 12; 120; 400; 1; 1.84; 3; 20; 22; 920; 25; 200; 27; 10; 2 [IU]/1; UG/1; MG/1; [IU]/1; MG/1; MG/1; MG/1; MG/1; MG/1; [IU]/1; MG/1; MG/1; MG/1; MG/1; MG/1
1 TABLET, FILM COATED ORAL DAILY
Qty: 30 TABLET | Status: SHIPPED
Start: 2021-07-06 | End: 2021-11-14

## 2021-07-06 RX ORDER — ACETAMINOPHEN 325 MG/1
325-650 TABLET ORAL EVERY 6 HOURS PRN
COMMUNITY
Start: 2021-07-06 | End: 2021-11-14

## 2021-07-06 RX ADMIN — PRENATAL WITH FERROUS FUM AND FOLIC ACID 1 TABLET: 3080; 920; 120; 400; 22; 1.84; 3; 20; 10; 1; 12; 200; 27; 25; 2 TABLET ORAL at 07:51

## 2021-07-06 RX ADMIN — IBUPROFEN 600 MG: 600 TABLET, FILM COATED ORAL at 05:52

## 2021-07-06 ASSESSMENT — EDINBURGH POSTNATAL DEPRESSION SCALE (EPDS)
THINGS HAVE BEEN GETTING ON TOP OF ME: YES, SOMETIMES I HAVEN'T BEEN COPING AS WELL AS USUAL
THE THOUGHT OF HARMING MYSELF HAS OCCURRED TO ME: NEVER
I HAVE LOOKED FORWARD WITH ENJOYMENT TO THINGS: AS MUCH AS I EVER DID
I HAVE BEEN ABLE TO LAUGH AND SEE THE FUNNY SIDE OF THINGS: NOT QUITE SO MUCH NOW
I HAVE BEEN SO UNHAPPY THAT I HAVE HAD DIFFICULTY SLEEPING: NOT AT ALL
I HAVE FELT SAD OR MISERABLE: NOT VERY OFTEN
I HAVE BLAMED MYSELF UNNECESSARILY WHEN THINGS WENT WRONG: NOT VERY OFTEN
I HAVE BEEN ANXIOUS OR WORRIED FOR NO GOOD REASON: HARDLY EVER
I HAVE BEEN SO UNHAPPY THAT I HAVE BEEN CRYING: NO, NEVER
I HAVE FELT SCARED OR PANICKY FOR NO GOOD REASON: NO, NOT AT ALL

## 2021-07-06 ASSESSMENT — PAIN DESCRIPTION - PAIN TYPE
TYPE: ACUTE PAIN
TYPE: ACUTE PAIN

## 2021-07-06 NOTE — CARE PLAN
Problem: Altered Physiologic Condition  Goal: Patient physiologically stable as evidenced by normal lochia, palpable uterine involution and vitals within normal limits  Outcome: Met  Note: Fundal massage done with light bleeding   The patient is hemodynamically stable    Shift Goals  Clinical Goals: lochia light. ambulating and voiding without difficulty    Progress made toward(s) clinical / shift goals:  Met    Patient is not progressing towards the following goals:

## 2021-07-06 NOTE — PROGRESS NOTES
Discharge instructions reviewed, bands verified, mother carried off unit in stable condition.    risk factors

## 2021-07-06 NOTE — DISCHARGE INSTRUCTIONS
PATIENT DISCHARGE EDUCATION INSTRUCTION SHEET  REASONS TO CALL YOUR OBSTETRICIAN  · Persistent fever, shaking, chills (Temperature higher than 100.4) may indicate you have an infection  · Heavy bleeding: soaking more than 1 pad per hour; Passing clots an egg-sized clot or bigger may mean you have an postpartum hemorrhage  · Foul odor from vagina or bad smelling or discolored discharge or blood  · Breast infection (Mastitis symptoms); breast pain, chills, fever, redness or red streaks, may feel flu like symptoms  · Urinary pain, burning or frequency  · Incision that is not healing, increased redness, swelling, tenderness or pain, or any pus from episiotomy or  site may mean you have an infection  · Redness, swelling, warmth, or painful to touch in the calf area of your leg may mean you have a blood clot  · Severe or intensified depression, thoughts or feelings of wanting to hurt yourself or someone else   · Pain in chest, obstructed breathing or shortness of breath (trouble catching your breath) may mean you are having a postpartum complication. Call your provider immediately   · Headache that does not get better, even after taking medicine, a bad headache with vision changes or pain in the upper right area of your belly may mean you have high blood pressure or post birth preeclampsia. Call your provider immediately    HAND WASHING  All family and friends should wash their hands:  · Before and after holding the baby  · Before feeding the baby  · After using the restroom or changing the baby's diaper    WOUND CARE  Ask your physician for additional care instructions. In general:  ·  Incision:  · May shower and pat incision dry   · Keep the incision clean and dry  · There should not be any opening or pus from the incision  · Continue to walk at home 3 times a day   · Do NOT lift anything heavier than your baby (over 10 pounds)  · Encourage family to help participate in care of the  to allow  rest and mom time to heal  · Episiotomy/Laceration  · May use charu-spray bottle, witch hazel pads and dermaplast spray for comfort  · Use charu-spray bottle after urinating to cleanse perineal area  · To prevent burning during urination spray charu-water bottle on labial area   · Pat perineal area dry until episiotomy/laceration is healed  · Continue to use charu-bottle until bleeding stops as needed  · If have a 2nd degree laceration or greater, a Sitz bath can offer relief from soreness, burning, and inflammation   · Sitz Bath   · Sit in 6 inches of warm water and soak laceration as needed until the laceration heals    VAGINAL CARE AND BLEEDING  · Nothing inside vagina for 6 weeks:   · No sexual intercourse, tampons or douching  · Bleeding may continue for 2-4 weeks. Amount and color may vary  · Soaking 1 pad or more in an hour for several hours is considered heavy bleeding  · Passing large egg sized blood clots can be concerning  · If you feel like you have heavy bleeding or are having increasing amount of blood clots call your Obstetrician immediately  · If you begin feeling faint upon standing, feeling sick to your stomach, have clammy skin, a really fast heartbeat, have chills, start feeling confused, dizzy, sleepy or weak, or feeling like you're going to faint call your Obstetrician immediately    HYPERTENSION   Preeclampsia or gestational hypertension are types of high blood pressure that only pregnant women can get. It is important for you to be aware of symptoms to seek early intervention and treatment. If you have any of these symptoms immediately call your Obstetrician    · Vision changes or blurred vision   · Severe headache or pain that is unrelieved with medication and will not go away  · Persistent pain in upper abdomen or shoulder   · Increased swelling of face, feet, or hands  · Difficulty breathing or shortness of breath at rest  · Urinating less than usual    URINATION AND BOWEL MOVEMENTS  · Eating  "more fiber (bran cereal, fruits, and vegetables) and drinking plenty of fluids will help to avoid constipation  · Urinary frequency and urgency after childbirth is normal  · If you experience any urinary pain, burning or frequency call your provider    BIRTH CONTROL  · It is possible to become pregnant at any time after delivery and while breastfeeding  · Plan to discuss a method of birth control with your physician at your post delivery follow up visit    POSTPARTUM BLUES  During the first few days after birth, you may experience a sense of the \"blues\" which may include impatience, irritability or even crying. These feelings come and go quickly. However, as many as 1 in 10 women experience emotional symptoms known as postpartum depression.     POSTPARTUM DEPRESSION    May start as early as the second or third day after delivery or take several weeks or months to develop. Symptoms of \"blues\" are present, but are more intense: Crying spells; loss of appetite; feelings of hopelessness or loss of control; fear of touching the baby; over concern or no concern at all about the baby; little or no concern about your own appearance/caring for yourself; and/or inability to sleep or excessive sleeping. Contact your Obstetrician if you are experiencing any of these symptoms     PREVENTING SHAKEN BABY  If you are angry or stressed, PUT THE BABY IN THE CRIB, step away, take some deep breaths, and wait until you are calm to care for the baby. DO NOT SHAKE THE BABY. You are not alone, call a supporter for help.  · [facility] 24/7 crisis call line (360-713-9674) or (1-316.366.5397)  · You can also text them, text \"ANSWER\" (283968)      "

## 2021-07-06 NOTE — PROGRESS NOTES
POSTPARTUM DAY 1    No complaints.  Patient is doing well with infant care and lactation issues.  Patient is voiding well.    PE:    Afebrile  BP normal    Uterus involuting appropriately, nontender  Perineum:  No perineal complaints, so I didn't do specific perineal exam.  Calves nontender, Roxanna negative bilaterally.     LAB:     7/5/2021 01:35 7/5/2021 20:08   WBC 9.6 14.9 (H)   Hemoglobin 10.9 (L) 10.0 (L)   Hematocrit 33.2 (L) 31.1 (L)   Platelet Count 198 193        PLAN:  Postpartum care.  Lactation consult.  Patient desires discharge:  today  .    Prescriptions:   PNV, OTC tylenol/ibuprofen PRN pain .  Followup plans:   6 wks .

## 2021-07-06 NOTE — CARE PLAN
Problem: Pain - Standard  Goal: Alleviation of pain or a reduction in pain to the patient’s comfort goal  Outcome: Progressing  Note: Denies pain   The patient is in stable condition    Shift Goals Pain controlled  Clinical Goals: lochia light. ambulating and voiding without difficulty    Progress made toward(s) clinical / shift goals:  Progressing    Patient is not progressing towards the following goals:

## 2021-07-06 NOTE — CARE PLAN
The patient is Stable - Low risk of patient condition declining or worsening    Shift Goals  Clinical Goals: pain control  Patient Goals: discharge home with infant   Family Goals: discharge home with infant     Progress made toward(s) clinical / shift goals:        Problem: Pain - Standard  Goal: Alleviation of pain or a reduction in pain to the patient’s comfort goal  Outcome: Progressing  Discussed pain management techniques with patient, questions answered, no needs at this time.      Problem: Knowledge Deficit - Postpartum  Goal: Patient will verbalize and demonstrate understanding of self and infant care  Outcome: Progressing   Patient updated on plan of care, questions answered, no needs at this time.     Patient is not progressing towards the following goals:    NA

## 2021-07-06 NOTE — LACTATION NOTE
This note was copied from a baby's chart.  MOB holding baby in arms, reports recently breast fed successfully on both breasts, offered to assist with a feeding attempt but mother declines at this time. Visitors/family present in room.    Reviewed hunger cues and normal feeding pattern for newborns.  Discussed use of hand expression if baby becomes sleepy and not interested in feeding during the first 24 hours of life.  Mom states no questions or concerns at this time.    Elise Pemberton RN, IBCLC, [facility]

## 2021-07-06 NOTE — PROGRESS NOTES
2200 Pt doing well bonding with baby. Assessment done denies pain at this time, Encourage to call for assistance, Needs attended.

## 2021-07-06 NOTE — LACTATION NOTE
Met with MOB for a lactation follow up visit.  MOB stated she is able to latch infant onto the right breast independently , but stated she  struggles with maintaining deep and consistent latch at the left breast.  Latch assistance provided.    Observed MOB attempt to put infant to the left breast in the cross cradle position to feed.  Infant's nose positioned far past the nipple.  Encouraged MOB to put infant tummy to tummy and nipple to nose and demonstrated optimal positioning of hands on infant and breast to maintain deep latch.   Also, encouraged MOB to remove infant from receiving blanket to keep infant more alert with feed.  MOB taught how to hand express drops of colostrum onto infant's lips, how to stroke her nipple down infant's nose to chin to illicit a wide mouth response from infant, and how to wedge breast for deeper latch.  Deep latch achieved.  MOB encouraged to position infant's chin down towards the bottom of her areola.  MOB denied pain with latch.  Further latch assistance declined.     Reviewed feeding cues with parents of infant and milk production.    Provided MOB with written resource list and information sheets on the breastfeeding assistance available to her through [facility], the [facility], and the [facility] [place] (via [employer]).    Breastfeeding Plan (as reviewed with MOB):  Offer infant the breast per feeding cues for a minimum of 8 or more breastfeeds in a 24 hour period.    MOB verbalized understanding of all information provided to her and denied having any further lactation questions and/or concerns at this time.  Encouraged MOB to call for lactation assistance as needed prior to discharge.

## 2021-08-19 NOTE — DISCHARGE SUMMARY
"DISCHARGE SUMMARY    DATE OF ADMISSION: 2021.    DATE OF DISCHARGE: 2021.    ADMISSION DIAGNOSIS:    1.  Intrauterine pregnancy at 40+0 weeks' gestation.  2.  SROM clear fluid on 2021 at 2300 hours.  3.  GBS negative.  4.  Rh negative.  5.  History of precipitous delivery 9 years ago.  6.  Active labor.    DISCHARGE DIAGNOSIS:    1.  As above.  2.  S/P  with repair of first degree perineal and periurethral laceration.    PPD #1    S:  Doing well.  Pain is well controlled.  Ambulating, voiding spontaneously, and tolerating a regular diet.  Working on breast feeding and ready for discharge today.    O:  /76   Pulse 88   Temp 36.2 °C (97.1 °F) (Temporal)   Resp 18   Ht 1.727 m (5' 8\")   Wt 85.3 kg (188 lb)   SpO2 97%     Results for KJ GARCÍA (MRN [id number]) as of 2021 07:57   Ref. Range 2021 01:35 2021 20:08   WBC Latest Ref Range: 4.8 - 10.8 K/uL 9.6 14.9 (H)   RBC Latest Ref Range: 4.20 - 5.40 M/uL 3.50 (L) 3.30 (L)   Hemoglobin Latest Ref Range: 12.0 - 16.0 g/dL 10.9 (L) 10.0 (L)   Hematocrit Latest Ref Range: 37.0 - 47.0 % 33.2 (L) 31.1 (L)   MCV Latest Ref Range: 81.4 - 97.8 fL 94.9 94.2   MCH Latest Ref Range: 27.0 - 33.0 pg 31.1 30.3   MCHC Latest Ref Range: 33.6 - 35.0 g/dL 32.8 (L) 32.2 (L)   RDW Latest Ref Range: 35.9 - 50.0 fL 46.7 45.9   Platelet Count Latest Ref Range: 164 - 446 K/uL 198 193   MPV Latest Ref Range: 9.0 - 12.9 fL 10.9 11.2       A/P: PPD #1 s/p .    1.  D/C to home today and follow up with Dr. Castro in 6 weeks for a postpartum examination.  2.  Ambulate TID.  3.  ADAT.  4.  D/C medications: ibuprofen and colace.  5.  Continue prenatal vitamins while breast feeding.  "

## 2021-11-14 ENCOUNTER — OFFICE VISIT (OUTPATIENT)
Dept: URGENT CARE | Facility: PHYSICIAN GROUP | Age: 31
End: 2021-11-14
Payer: COMMERCIAL

## 2021-11-14 VITALS
DIASTOLIC BLOOD PRESSURE: 68 MMHG | OXYGEN SATURATION: 97 % | RESPIRATION RATE: 16 BRPM | BODY MASS INDEX: 26.61 KG/M2 | HEART RATE: 84 BPM | WEIGHT: 175 LBS | TEMPERATURE: 98.1 F | SYSTOLIC BLOOD PRESSURE: 112 MMHG

## 2021-11-14 DIAGNOSIS — J06.9 VIRAL URI: ICD-10-CM

## 2021-11-14 DIAGNOSIS — U07.1 COVID-19 VIRUS INFECTION: ICD-10-CM

## 2021-11-14 LAB
EXTERNAL QUALITY CONTROL: NORMAL
SARS-COV+SARS-COV-2 AG RESP QL IA.RAPID: POSITIVE

## 2021-11-14 PROCEDURE — 99203 OFFICE O/P NEW LOW 30 MIN: CPT | Mod: CS | Performed by: PHYSICIAN ASSISTANT

## 2021-11-14 RX ORDER — DESOGESTREL AND ETHINYL ESTRADIOL 0.15-0.03
1 KIT ORAL
COMMUNITY
Start: 2021-11-05

## 2021-11-14 RX ORDER — VALACYCLOVIR HYDROCHLORIDE 500 MG/1
TABLET, FILM COATED ORAL
COMMUNITY
Start: 2021-11-05 | End: 2021-11-14

## 2021-11-14 ASSESSMENT — ENCOUNTER SYMPTOMS
CHILLS: 0
MYALGIAS: 0
HEADACHES: 0
NAUSEA: 0
SHORTNESS OF BREATH: 0
ABDOMINAL PAIN: 0
SORE THROAT: 1
COUGH: 1
BLURRED VISION: 0
DIARRHEA: 0
EYE PAIN: 0
DIZZINESS: 0
SINUS PAIN: 0
PALPITATIONS: 0
VOMITING: 0
FEVER: 0

## 2021-11-14 NOTE — PROGRESS NOTES
Subjective     Avery Mancini is a 30 y.o. female who presents with Congestion (sob, dry cough, x2 days )    HPI:  Avery Mancini is a 30 y.o. female who presents today for evaluation of URI symptoms.  Patient's daughter has been sick with symptoms for the past 5 to 6 days.  Patient herself started to get sick 2 days ago with nasal congestion/runny nose, scratchy throat, mild dry cough and some mild shortness of breath and fatigue.  She has not had any fever.  She has been taking [place] cold/flu for symptoms which provides moderate relief.      Review of Systems   Constitutional: Positive for malaise/fatigue. Negative for chills and fever.   HENT: Positive for congestion and sore throat. Negative for ear pain and sinus pain.    Eyes: Negative for blurred vision and pain.   Respiratory: Positive for cough. Negative for shortness of breath.    Cardiovascular: Negative for chest pain and palpitations.   Gastrointestinal: Negative for abdominal pain, diarrhea, nausea and vomiting.   Musculoskeletal: Negative for myalgias.   Skin: Negative for rash.   Neurological: Negative for dizziness and headaches.           PMH:  has a past medical history of Allergy, unspecified not elsewhere classified, Arthritis, GERD (gastroesophageal reflux disease), Headache(784.0), Headache, classical migraine, IBD (inflammatory bowel disease), and Pregnancy. She also has no past medical history of Anemia, Anxiety, Arrhythmia, ASTHMA, Asymptomatic human immunodeficiency virus (HIV) infection status ([facility]), Blood transfusion, without reported diagnosis, Cancer ([facility]), CHF (congestive heart failure) ([facility]), Chronic airway obstruction, not elsewhere classified, Clotting disorder ([facility]), Depression, Diabetes, Diabetic neuropathy ([facility]), Glaucoma, Goiter, Heart attack ([facility]), Heart murmur, Hyperlipidemia, Hypertension, Kidney disease, Meningitis, Osteoporosis, unspecified, Other emphysema ([facility]), Seizure ([facility]), Stroke ([facility]),  Substance abuse ([facility]), Thyroid disease, Tuberculosis, Type II or unspecified type diabetes mellitus without mention of complication, not stated as uncontrolled, Ulcer, Unspecified asthma(493.90), Unspecified cataract, or Urinary tract infection, site not specified.  MEDS:   Current Outpatient Medications:   •  ISIBLOOM 0.15-30 MG-MCG per tablet, Take 1 Tablet by mouth every day., Disp: , Rfl:   ALLERGIES:   Allergies   Allergen Reactions   • Gluten      SURGHX:   Past Surgical History:   Procedure Laterality Date   • HIP ARTHROSCOPY       SOCHX:  reports that she has never smoked. She has never used smokeless tobacco. She reports current alcohol use of about 0.5 oz of alcohol per week. She reports that she does not use drugs.  FH: Family history was reviewed, no pertinent findings to report    Objective     /68 (BP Location: Left arm, Patient Position: Sitting, BP Cuff Size: Large adult)   Pulse 84   Temp 36.7 °C (98.1 °F) (Temporal)   Resp 16   Wt 79.4 kg (175 lb)   SpO2 97%   BMI 26.61 kg/m²      Physical Exam  Constitutional:       Appearance: She is well-developed.   HENT:      Head: Normocephalic and atraumatic.      Right Ear: Tympanic membrane, ear canal and external ear normal.      Left Ear: Tympanic membrane, ear canal and external ear normal.      Nose: Mucosal edema and congestion present. No rhinorrhea.      Mouth/Throat:      Lips: Pink.      Mouth: Mucous membranes are moist.      Pharynx: Oropharynx is clear.   Eyes:      Conjunctiva/sclera: Conjunctivae normal.      Pupils: Pupils are equal, round, and reactive to light.   Cardiovascular:      Rate and Rhythm: Normal rate and regular rhythm.      Heart sounds: Normal heart sounds. No murmur heard.      Pulmonary:      Effort: Pulmonary effort is normal.      Breath sounds: Normal breath sounds. No decreased breath sounds, wheezing, rhonchi or rales.   Musculoskeletal:      Cervical back: Normal range of motion.   Lymphadenopathy:       Cervical: Cervical adenopathy present.   Skin:     General: Skin is warm and dry.      Capillary Refill: Capillary refill takes less than 2 seconds.   Neurological:      Mental Status: She is alert and oriented to person, place, and time.   Psychiatric:         Behavior: Behavior normal.         Judgment: Judgment normal.         POCT SARS-COV Antigen AMARILIS Manual Result - POSITIVE    Assessment & Plan     1. Viral URI  - POCT SARS-COV Antigen AMARILIS Manual Result  - OTC cold/flu medications  - PO fluids  - Rest  - Tylenol or ibuprofen as needed for fever > 100.4 F    2. COVID-19 virus infection  *Patient tested positive for COVID-19 virus in the urgent care today.  She was advised that she will need to self isolate for 10 days, day 1 being the first day of her symptom onset.        Differential Diagnosis, natural history, and supportive care discussed. Return to the Urgent Care or follow up with your PCP if symptoms fail to resolve, or for any new or worsening symptoms. Emergency room precautions discussed. Patient and/or family appears understanding of information.

## 2022-04-11 ENCOUNTER — APPOINTMENT (RX ONLY)
Dept: URBAN - METROPOLITAN AREA CLINIC 6 | Facility: CLINIC | Age: 32
Setting detail: DERMATOLOGY
End: 2022-04-11

## 2022-04-11 DIAGNOSIS — L71.0 PERIORAL DERMATITIS: ICD-10-CM

## 2022-04-11 PROCEDURE — ? COUNSELING

## 2022-04-11 PROCEDURE — ? PRESCRIPTION

## 2022-04-11 PROCEDURE — ? DIAGNOSIS COMMENT

## 2022-04-11 PROCEDURE — 99203 OFFICE O/P NEW LOW 30 MIN: CPT

## 2022-04-11 RX ORDER — METRONIDAZOLE 10 MG/G
1 GEL TOPICAL QHS
Qty: 60 | Refills: 3 | Status: ERX | COMMUNITY
Start: 2022-04-11

## 2022-04-11 RX ADMIN — METRONIDAZOLE 1: 10 GEL TOPICAL at 00:00

## 2022-04-11 ASSESSMENT — LOCATION SIMPLE DESCRIPTION DERM
LOCATION SIMPLE: RIGHT CHEEK
LOCATION SIMPLE: LEFT LIP

## 2022-04-11 ASSESSMENT — LOCATION ZONE DERM
LOCATION ZONE: LIP
LOCATION ZONE: FACE

## 2022-04-11 ASSESSMENT — LOCATION DETAILED DESCRIPTION DERM
LOCATION DETAILED: LEFT UPPER CUTANEOUS LIP
LOCATION DETAILED: LEFT LOWER CUTANEOUS LIP
LOCATION DETAILED: RIGHT CENTRAL BUCCAL CHEEK

## 2022-04-11 NOTE — PROCEDURE: DIAGNOSIS COMMENT
Render Risk Assessment In Note?: no
Detail Level: Simple
Comment: Present for 2 1/2 months. Discussed possible triggers at length, information handout provided. We will prescribe Metrogel and follow up in 3 months.

## 2022-04-13 ENCOUNTER — RX ONLY (OUTPATIENT)
Age: 32
Setting detail: RX ONLY
End: 2022-04-13

## 2022-04-13 RX ORDER — METRONIDAZOLE 10 MG/G
GEL TOPICAL
Qty: 60 | Refills: 3 | Status: CANCELLED
Stop reason: SDUPTHER

## 2022-07-19 ENCOUNTER — APPOINTMENT (RX ONLY)
Dept: URBAN - METROPOLITAN AREA CLINIC 6 | Facility: CLINIC | Age: 32
Setting detail: DERMATOLOGY
End: 2022-07-19

## 2022-07-19 DIAGNOSIS — L81.4 OTHER MELANIN HYPERPIGMENTATION: ICD-10-CM

## 2022-07-19 DIAGNOSIS — D22 MELANOCYTIC NEVI: ICD-10-CM

## 2022-07-19 DIAGNOSIS — L82.1 OTHER SEBORRHEIC KERATOSIS: ICD-10-CM

## 2022-07-19 DIAGNOSIS — D18.0 HEMANGIOMA: ICD-10-CM

## 2022-07-19 DIAGNOSIS — L71.0 PERIORAL DERMATITIS: ICD-10-CM | Status: WELL CONTROLLED

## 2022-07-19 DIAGNOSIS — Z71.89 OTHER SPECIFIED COUNSELING: ICD-10-CM

## 2022-07-19 PROBLEM — D22.71 MELANOCYTIC NEVI OF RIGHT LOWER LIMB, INCLUDING HIP: Status: ACTIVE | Noted: 2022-07-19

## 2022-07-19 PROBLEM — D22.5 MELANOCYTIC NEVI OF TRUNK: Status: ACTIVE | Noted: 2022-07-19

## 2022-07-19 PROBLEM — D18.01 HEMANGIOMA OF SKIN AND SUBCUTANEOUS TISSUE: Status: ACTIVE | Noted: 2022-07-19

## 2022-07-19 PROBLEM — D22.61 MELANOCYTIC NEVI OF RIGHT UPPER LIMB, INCLUDING SHOULDER: Status: ACTIVE | Noted: 2022-07-19

## 2022-07-19 PROBLEM — D22.62 MELANOCYTIC NEVI OF LEFT UPPER LIMB, INCLUDING SHOULDER: Status: ACTIVE | Noted: 2022-07-19

## 2022-07-19 PROBLEM — D22.72 MELANOCYTIC NEVI OF LEFT LOWER LIMB, INCLUDING HIP: Status: ACTIVE | Noted: 2022-07-19

## 2022-07-19 PROCEDURE — ? DIAGNOSIS COMMENT

## 2022-07-19 PROCEDURE — 99213 OFFICE O/P EST LOW 20 MIN: CPT

## 2022-07-19 PROCEDURE — ? COUNSELING

## 2022-07-19 ASSESSMENT — LOCATION SIMPLE DESCRIPTION DERM
LOCATION SIMPLE: CHEST
LOCATION SIMPLE: RIGHT CHEEK
LOCATION SIMPLE: LEFT KNEE
LOCATION SIMPLE: LEFT THIGH
LOCATION SIMPLE: LEFT UPPER ARM
LOCATION SIMPLE: RIGHT PRETIBIAL REGION
LOCATION SIMPLE: RIGHT UPPER ARM
LOCATION SIMPLE: RIGHT KNEE
LOCATION SIMPLE: LEFT FOREARM
LOCATION SIMPLE: ABDOMEN
LOCATION SIMPLE: LEFT PRETIBIAL REGION
LOCATION SIMPLE: RIGHT THIGH
LOCATION SIMPLE: LEFT CHEEK
LOCATION SIMPLE: RIGHT FOREARM

## 2022-07-19 ASSESSMENT — LOCATION DETAILED DESCRIPTION DERM
LOCATION DETAILED: PERIUMBILICAL SKIN
LOCATION DETAILED: EPIGASTRIC SKIN
LOCATION DETAILED: LEFT MEDIAL BUCCAL CHEEK
LOCATION DETAILED: RIGHT ANTECUBITAL SKIN
LOCATION DETAILED: RIGHT ANTERIOR PROXIMAL UPPER ARM
LOCATION DETAILED: RIGHT CENTRAL BUCCAL CHEEK
LOCATION DETAILED: RIGHT ANTERIOR DISTAL UPPER ARM
LOCATION DETAILED: LEFT VENTRAL PROXIMAL FOREARM
LOCATION DETAILED: LEFT ANTERIOR DISTAL THIGH
LOCATION DETAILED: RIGHT ANTERIOR DISTAL THIGH
LOCATION DETAILED: LOWER STERNUM
LOCATION DETAILED: RIGHT VENTRAL PROXIMAL FOREARM
LOCATION DETAILED: LEFT ANTERIOR DISTAL UPPER ARM
LOCATION DETAILED: LEFT PROXIMAL PRETIBIAL REGION
LOCATION DETAILED: LEFT ANTERIOR PROXIMAL UPPER ARM
LOCATION DETAILED: RIGHT PROXIMAL PRETIBIAL REGION
LOCATION DETAILED: RIGHT KNEE
LOCATION DETAILED: LEFT KNEE

## 2022-07-19 ASSESSMENT — LOCATION ZONE DERM
LOCATION ZONE: TRUNK
LOCATION ZONE: ARM
LOCATION ZONE: LEG
LOCATION ZONE: FACE

## 2022-07-19 NOTE — PROCEDURE: DIAGNOSIS COMMENT
Comment: Patient states this is well controlled with intermittent metronidazole use. Follow up as needed.
Render Risk Assessment In Note?: no
Detail Level: Simple

## 2022-12-17 ENCOUNTER — OFFICE VISIT (OUTPATIENT)
Dept: URGENT CARE | Facility: PHYSICIAN GROUP | Age: 32
End: 2022-12-17
Payer: COMMERCIAL

## 2022-12-17 VITALS
HEART RATE: 78 BPM | WEIGHT: 166 LBS | SYSTOLIC BLOOD PRESSURE: 110 MMHG | RESPIRATION RATE: 14 BRPM | HEIGHT: 68 IN | BODY MASS INDEX: 25.16 KG/M2 | OXYGEN SATURATION: 97 % | DIASTOLIC BLOOD PRESSURE: 78 MMHG | TEMPERATURE: 97.3 F

## 2022-12-17 DIAGNOSIS — G43.009 MIGRAINE WITHOUT AURA AND WITHOUT STATUS MIGRAINOSUS, NOT INTRACTABLE: ICD-10-CM

## 2022-12-17 DIAGNOSIS — H66.002 NON-RECURRENT ACUTE SUPPURATIVE OTITIS MEDIA OF LEFT EAR WITHOUT SPONTANEOUS RUPTURE OF TYMPANIC MEMBRANE: ICD-10-CM

## 2022-12-17 DIAGNOSIS — J06.9 UPPER RESPIRATORY TRACT INFECTION, UNSPECIFIED TYPE: ICD-10-CM

## 2022-12-17 PROCEDURE — 99213 OFFICE O/P EST LOW 20 MIN: CPT

## 2022-12-17 RX ORDER — CETIRIZINE HYDROCHLORIDE 10 MG/1
10 TABLET ORAL DAILY
Qty: 30 TABLET | Refills: 0 | Status: SHIPPED | OUTPATIENT
Start: 2022-12-17

## 2022-12-17 RX ORDER — FLUTICASONE PROPIONATE 50 MCG
1 SPRAY, SUSPENSION (ML) NASAL DAILY
Qty: 16 G | Refills: 0 | Status: SHIPPED | OUTPATIENT
Start: 2022-12-17

## 2022-12-17 RX ORDER — VALACYCLOVIR HYDROCHLORIDE 500 MG/1
TABLET, FILM COATED ORAL
COMMUNITY
Start: 2022-11-12

## 2022-12-17 RX ORDER — AMOXICILLIN AND CLAVULANATE POTASSIUM 875; 125 MG/1; MG/1
1 TABLET, FILM COATED ORAL 2 TIMES DAILY
Qty: 14 TABLET | Refills: 0 | Status: SHIPPED | OUTPATIENT
Start: 2022-12-17 | End: 2022-12-24

## 2022-12-17 RX ORDER — SUMATRIPTAN 6 MG/.5ML
6 INJECTION, SOLUTION SUBCUTANEOUS ONCE
Status: COMPLETED | OUTPATIENT
Start: 2022-12-17 | End: 2022-12-17

## 2022-12-17 RX ADMIN — SUMATRIPTAN 6 MG: 6 INJECTION, SOLUTION SUBCUTANEOUS at 10:51

## 2022-12-17 NOTE — PROGRESS NOTES
Subjective:   Avery Mancini is a 31 y.o. female who presents for Cough (X 4 days, cough, runny nose, right ear pain, nasal congestion, headache, nausea, sore throat, body aches, chills)      HPI:    Patient presents to urgent care with concerns with exposure to influenza and rsv. Her daughter tested positive for both on Tuesday. Patient reports she started to feel sick Tuesday night. Her symptoms consisted of right ear pain, runny nose, congestion, migraine headache, nausea, sore throat, chills.   Mild improvement with sudafed, mucinex, theraflu, otc cough and cold medications. She reports increased warm showers,   Reports sound and light sensitivity. Headache is located over the right side of her forehead, behind the eyes. She states tylenol and ibuprofen have not helped at all.   Denies fever, chills, night sweats, nausea, vomiting, diarrhea  The right ear pain is the reason  the patient decided to seek medical care today. She reports throbbing sensation with pain radiating down her jaw and into her teeth.     ROS As above in HPI    Medications:    Current Outpatient Medications on File Prior to Visit   Medication Sig Dispense Refill    ISIBLOOM 0.15-30 MG-MCG per tablet Take 1 Tablet by mouth every day.      valACYclovir (VALTREX) 500 MG Tab TAKE 2 TABLETS BY MOUTH AT FIRST SIGN OF SYMPTOMS AND TAKE 2 TABLETS BY MOUTH 12 HOURS LATER       No current facility-administered medications on file prior to visit.        Allergies:   Gluten    Problem List:   Patient Active Problem List   Diagnosis    Celiac disease    Hip pain, chronic    Menorrhagia    Labor and delivery indication for care or intervention        Surgical History:  Past Surgical History:   Procedure Laterality Date    HIP ARTHROSCOPY         Past Social Hx:   Social History     Tobacco Use    Smoking status: Never    Smokeless tobacco: Never   Vaping Use    Vaping Use: Never used   Substance Use Topics    Alcohol use: Yes     Alcohol/week: 0.5  "oz     Types: 1 drink(s) per week     Comment: occasionally    Drug use: Yes     Types: Oral     Comment: THC gummies          Problem list, medications, and allergies reviewed by myself today in Epic.     Objective:     /78   Pulse 78   Temp 36.3 °C (97.3 °F) (Temporal)   Resp 14   Ht 1.727 m (5' 8\")   Wt 75.3 kg (166 lb)   SpO2 97%   BMI 25.24 kg/m²     Physical Exam  Vitals and nursing note reviewed.   Constitutional:       Appearance: Normal appearance.   HENT:      Head: Normocephalic and atraumatic.      Right Ear: Ear canal normal. Tenderness present. No swelling. A middle ear effusion is present. No mastoid tenderness. Tympanic membrane is erythematous and bulging.      Left Ear: Ear canal normal. No swelling or tenderness.  No middle ear effusion. No mastoid tenderness. Tympanic membrane is injected. Tympanic membrane is not erythematous or bulging.      Nose: Congestion and rhinorrhea present. Rhinorrhea is clear.      Right Sinus: No maxillary sinus tenderness or frontal sinus tenderness.      Left Sinus: No maxillary sinus tenderness or frontal sinus tenderness.      Mouth/Throat:      Mouth: Mucous membranes are moist.      Pharynx: Uvula midline. No pharyngeal swelling, oropharyngeal exudate or posterior oropharyngeal erythema.      Tonsils: No tonsillar exudate.   Eyes:      Conjunctiva/sclera: Conjunctivae normal.      Pupils: Pupils are equal, round, and reactive to light.   Cardiovascular:      Rate and Rhythm: Normal rate and regular rhythm.      Heart sounds: Normal heart sounds.   Pulmonary:      Effort: Pulmonary effort is normal. No respiratory distress.      Breath sounds: Normal breath sounds. No stridor. No decreased breath sounds, wheezing, rhonchi or rales.   Chest:      Chest wall: No tenderness.   Abdominal:      General: Bowel sounds are normal.      Palpations: Abdomen is soft.   Musculoskeletal:      Cervical back: No rigidity or tenderness.   Lymphadenopathy:      " Cervical: No cervical adenopathy.   Skin:     General: Skin is warm and dry.      Capillary Refill: Capillary refill takes less than 2 seconds.      Findings: No rash.   Neurological:      Mental Status: She is alert and oriented to person, place, and time.       Assessment/Plan:     Diagnosis and associated orders:   1. Non-recurrent acute suppurative otitis media of left ear without spontaneous rupture of tympanic membrane  - amoxicillin-clavulanate (AUGMENTIN) 875-125 MG Tab; Take 1 Tablet by mouth 2 times a day for 7 days.  Dispense: 14 Tablet; Refill: 0    2. Migraine without aura and without status migrainosus, not intractable  - SUMAtriptan Succinate (IMITREX) injection 6 mg    3. Upper respiratory tract infection, unspecified type  - cetirizine (ZYRTEC) 10 MG Tab; Take 1 Tablet by mouth every day.  Dispense: 30 Tablet; Refill: 0  - fluticasone (FLONASE) 50 MCG/ACT nasal spray; Administer 1 Spray into affected nostril(S) every day.  Dispense: 16 g; Refill: 0       Comments/MDM:     Patient opted for no testing given duration of illness. Social isolation per CDC guidelines encouraged.   Migraine improved with Imitrex in office. Toradol is out of stock in office.   Supportive measures encouraged: Rest, increased oral hydration, NSAIDs/tylenol as needed per package instructions, decongestant, warm humidification, remove nasal secretions, otc cough suppressant as needed.       Pt is clinically stable at today's acute urgent care visit.  No acute distress noted. Appropriate for outpatient management at this time.       Discussed DDx, management options (risks,benefits, and alternatives to planned treatment), natural progression and supportive care.  Expressed understanding and the treatment plan was agreed upon. Questions were encouraged and answered   Return to urgent care prn if new or worsening sx or if there is no improvement in condition prn.    Educated in Red flags and indications to immediately call 911 or  present to the Emergency Department.     Please note that this dictation was created using voice recognition software. I have made a reasonable attempt to correct obvious errors, but I expect that there are errors of grammar and possibly content that I did not discover before finalizing the note.    This note was electronically signed by Raissa Villeda DNP

## 2023-04-22 ENCOUNTER — APPOINTMENT (OUTPATIENT)
Dept: RADIOLOGY | Facility: MEDICAL CENTER | Age: 33
End: 2023-04-22
Attending: EMERGENCY MEDICINE
Payer: COMMERCIAL

## 2023-04-22 ENCOUNTER — HOSPITAL ENCOUNTER (EMERGENCY)
Facility: MEDICAL CENTER | Age: 33
End: 2023-04-22
Attending: EMERGENCY MEDICINE
Payer: COMMERCIAL

## 2023-04-22 VITALS
OXYGEN SATURATION: 98 % | HEIGHT: 71 IN | WEIGHT: 170 LBS | DIASTOLIC BLOOD PRESSURE: 70 MMHG | SYSTOLIC BLOOD PRESSURE: 114 MMHG | HEART RATE: 107 BPM | BODY MASS INDEX: 23.8 KG/M2 | TEMPERATURE: 98.2 F | RESPIRATION RATE: 20 BRPM

## 2023-04-22 DIAGNOSIS — J45.21 INTERMITTENT ASTHMA WITH ACUTE EXACERBATION, UNSPECIFIED ASTHMA SEVERITY: ICD-10-CM

## 2023-04-22 LAB
ALBUMIN SERPL BCP-MCNC: 4.7 G/DL (ref 3.2–4.9)
ALBUMIN/GLOB SERPL: 1.7 G/DL
ALP SERPL-CCNC: 71 U/L (ref 30–99)
ALT SERPL-CCNC: 16 U/L (ref 2–50)
ANION GAP SERPL CALC-SCNC: 15 MMOL/L (ref 7–16)
AST SERPL-CCNC: 16 U/L (ref 12–45)
BASOPHILS # BLD AUTO: 0.4 % (ref 0–1.8)
BASOPHILS # BLD: 0.03 K/UL (ref 0–0.12)
BILIRUB SERPL-MCNC: 0.4 MG/DL (ref 0.1–1.5)
BUN SERPL-MCNC: 17 MG/DL (ref 8–22)
CALCIUM ALBUM COR SERPL-MCNC: 8.9 MG/DL (ref 8.5–10.5)
CALCIUM SERPL-MCNC: 9.5 MG/DL (ref 8.4–10.2)
CHLORIDE SERPL-SCNC: 108 MMOL/L (ref 96–112)
CO2 SERPL-SCNC: 21 MMOL/L (ref 20–33)
CREAT SERPL-MCNC: 0.85 MG/DL (ref 0.5–1.4)
EKG IMPRESSION: NORMAL
EOSINOPHIL # BLD AUTO: 0.38 K/UL (ref 0–0.51)
EOSINOPHIL NFR BLD: 4.8 % (ref 0–6.9)
ERYTHROCYTE [DISTWIDTH] IN BLOOD BY AUTOMATED COUNT: 41.7 FL (ref 35.9–50)
GFR SERPLBLD CREATININE-BSD FMLA CKD-EPI: 94 ML/MIN/1.73 M 2
GLOBULIN SER CALC-MCNC: 2.8 G/DL (ref 1.9–3.5)
GLUCOSE SERPL-MCNC: 83 MG/DL (ref 65–99)
HCG SERPL QL: NEGATIVE
HCT VFR BLD AUTO: 44.7 % (ref 37–47)
HGB BLD-MCNC: 15.1 G/DL (ref 12–16)
IMM GRANULOCYTES # BLD AUTO: 0.1 K/UL (ref 0–0.11)
IMM GRANULOCYTES NFR BLD AUTO: 1.3 % (ref 0–0.9)
LYMPHOCYTES # BLD AUTO: 2.83 K/UL (ref 1–4.8)
LYMPHOCYTES NFR BLD: 36.1 % (ref 22–41)
MCH RBC QN AUTO: 31.3 PG (ref 27–33)
MCHC RBC AUTO-ENTMCNC: 33.8 G/DL (ref 33.6–35)
MCV RBC AUTO: 92.7 FL (ref 81.4–97.8)
MONOCYTES # BLD AUTO: 0.34 K/UL (ref 0–0.85)
MONOCYTES NFR BLD AUTO: 4.3 % (ref 0–13.4)
NEUTROPHILS # BLD AUTO: 4.16 K/UL (ref 2–7.15)
NEUTROPHILS NFR BLD: 53.1 % (ref 44–72)
NRBC # BLD AUTO: 0 K/UL
NRBC BLD-RTO: 0 /100 WBC
PLATELET # BLD AUTO: 218 K/UL (ref 164–446)
PMV BLD AUTO: 10.2 FL (ref 9–12.9)
POTASSIUM SERPL-SCNC: 3.7 MMOL/L (ref 3.6–5.5)
PROT SERPL-MCNC: 7.5 G/DL (ref 6–8.2)
RBC # BLD AUTO: 4.82 M/UL (ref 4.2–5.4)
SODIUM SERPL-SCNC: 144 MMOL/L (ref 135–145)
WBC # BLD AUTO: 7.8 K/UL (ref 4.8–10.8)

## 2023-04-22 PROCEDURE — 85025 COMPLETE CBC W/AUTO DIFF WBC: CPT

## 2023-04-22 PROCEDURE — 94760 N-INVAS EAR/PLS OXIMETRY 1: CPT

## 2023-04-22 PROCEDURE — 36415 COLL VENOUS BLD VENIPUNCTURE: CPT

## 2023-04-22 PROCEDURE — A9270 NON-COVERED ITEM OR SERVICE: HCPCS | Performed by: EMERGENCY MEDICINE

## 2023-04-22 PROCEDURE — 96374 THER/PROPH/DIAG INJ IV PUSH: CPT

## 2023-04-22 PROCEDURE — 84703 CHORIONIC GONADOTROPIN ASSAY: CPT

## 2023-04-22 PROCEDURE — 93005 ELECTROCARDIOGRAM TRACING: CPT | Performed by: EMERGENCY MEDICINE

## 2023-04-22 PROCEDURE — 99284 EMERGENCY DEPT VISIT MOD MDM: CPT

## 2023-04-22 PROCEDURE — 700101 HCHG RX REV CODE 250: Performed by: EMERGENCY MEDICINE

## 2023-04-22 PROCEDURE — 94640 AIRWAY INHALATION TREATMENT: CPT

## 2023-04-22 PROCEDURE — 80053 COMPREHEN METABOLIC PANEL: CPT

## 2023-04-22 PROCEDURE — 71045 X-RAY EXAM CHEST 1 VIEW: CPT

## 2023-04-22 PROCEDURE — 700102 HCHG RX REV CODE 250 W/ 637 OVERRIDE(OP): Performed by: EMERGENCY MEDICINE

## 2023-04-22 PROCEDURE — 700111 HCHG RX REV CODE 636 W/ 250 OVERRIDE (IP): Performed by: EMERGENCY MEDICINE

## 2023-04-22 RX ORDER — DEXAMETHASONE SODIUM PHOSPHATE 10 MG/ML
10 INJECTION, SOLUTION INTRAMUSCULAR; INTRAVENOUS ONCE
Status: COMPLETED | OUTPATIENT
Start: 2023-04-22 | End: 2023-04-22

## 2023-04-22 RX ORDER — ALBUTEROL SULFATE 90 UG/1
2 AEROSOL, METERED RESPIRATORY (INHALATION) ONCE
Status: COMPLETED | OUTPATIENT
Start: 2023-04-22 | End: 2023-04-22

## 2023-04-22 RX ORDER — IPRATROPIUM BROMIDE AND ALBUTEROL SULFATE 2.5; .5 MG/3ML; MG/3ML
3 SOLUTION RESPIRATORY (INHALATION)
Status: COMPLETED | OUTPATIENT
Start: 2023-04-22 | End: 2023-04-22

## 2023-04-22 RX ORDER — ALBUTEROL SULFATE 90 UG/1
2 AEROSOL, METERED RESPIRATORY (INHALATION) EVERY 6 HOURS PRN
Qty: 8.5 G | Refills: 0 | Status: SHIPPED | OUTPATIENT
Start: 2023-04-22

## 2023-04-22 RX ORDER — KETOROLAC TROMETHAMINE 30 MG/ML
15 INJECTION, SOLUTION INTRAMUSCULAR; INTRAVENOUS ONCE
Status: COMPLETED | OUTPATIENT
Start: 2023-04-22 | End: 2023-04-22

## 2023-04-22 RX ADMIN — KETOROLAC TROMETHAMINE 15 MG: 30 INJECTION, SOLUTION INTRAMUSCULAR at 01:06

## 2023-04-22 RX ADMIN — DEXAMETHASONE SODIUM PHOSPHATE 10 MG: 10 INJECTION INTRAMUSCULAR; INTRAVENOUS at 00:33

## 2023-04-22 RX ADMIN — ALBUTEROL SULFATE 2 PUFF: 90 AEROSOL, METERED RESPIRATORY (INHALATION) at 01:23

## 2023-04-22 RX ADMIN — IPRATROPIUM BROMIDE AND ALBUTEROL SULFATE 3 ML: .5; 2.5 SOLUTION RESPIRATORY (INHALATION) at 00:36

## 2023-04-22 NOTE — DISCHARGE INSTRUCTIONS
You were seen in the emergency department for shortness of breath in the setting of allergies.  You appear to have had an asthma attack.  You have been given a dose of steroids which should last for the next few days to help reduce the inflammation in your lungs.  You are also being sent home with an inhaler with spacer.  Please use the spacer anytime you use the inhaler.  You may use this every 4-6 hours as needed.    You have been referred to establish a primary care physician.  You may also contact the number to schedule an appointment.    You may ultimately benefit from seeing an allergist.  For allergies, we recommend a nondrowsy antihistamine such as Xyzal, Zyrtec, Claritin, as well as nasal fluticasone (Flonase).  You may use nasal decongestants for short periods of time as needed, however these are not a good long-term solution.    Please return to the emergency department or seek medical attention if you develop:  Difficulty breathing, coughing up blood, swelling of one of your legs, any other new or concerning findings

## 2023-04-22 NOTE — ED PROVIDER NOTES
ED Provider Note        CHIEF COMPLAINT  Chief Complaint   Patient presents with    Shortness of Breath     Started 3wks ago worse today increasing SOB and tightness in chest          HPI      Avery Mancini is a 32 y.o. female who presents to the Emergency Department with shortness of breath.  The patient has been having nasal congestion which she believes is due to seasonal allergies over the past few weeks, however earlier tonight she began having difficulty breathing with tightness and burning sensation in the center of her chest as well as a dry cough.  She denies any fevers.  Denies any personal history of asthma but has a family history of asthma.  She has been taking over-the-counter antihistamines as well as Flonase for her symptoms and took additional Benadryl tonight which did not help her breathing.  She denies any throat swelling.    REVIEW OF SYSTEMS  See HPI for further details. All other systems are negative.     PAST MEDICAL HISTORY     Past Medical History:   Diagnosis Date    Allergy, unspecified not elsewhere classified     Arthritis     hips    GERD (gastroesophageal reflux disease)     w pregnancy    Headache(784.0)     Headache, classical migraine     IBD (inflammatory bowel disease)     Celiacs disease    Pregnancy        SURGICAL HISTORY  Past Surgical History:   Procedure Laterality Date    HIP ARTHROSCOPY         FAMILY HISTORY  Family History   Problem Relation Age of Onset    Arthritis Mother     Arthritis Father     Heart Disease Father     Hypertension Father     Hyperlipidemia Father     Lung Disease Father         smoker    Alcohol/Drug Father         etoh    Alcohol/Drug Maternal Aunt     Alcohol/Drug Maternal Uncle         etoh, drug    Psychiatric Illness Maternal Uncle         drug abuse    Psychiatric Illness Paternal Aunt         depression/suicidal    Alcohol/Drug Maternal Grandmother         etoh    Arthritis Maternal Grandmother         Gout    Lung Disease Maternal  "Grandfather         smoker, copd/emphysema    Lung Disease Paternal Grandmother         smoker    Heart Disease Paternal Grandfather     Hypertension Paternal Grandfather     Hyperlipidemia Paternal Grandfather        SOCIAL HISTORY    reports that she has never smoked. She has never used smokeless tobacco. She reports current alcohol use of about 0.5 oz per week. She reports current drug use. Drug: Oral.    CURRENT MEDICATIONS  Home Medications    **Home medications have not yet been reviewed for this encounter**         ALLERGIES  Allergies   Allergen Reactions    Gluten        PHYSICAL EXAM  VITAL SIGNS: BP (!) 146/90   Pulse (!) 102   Temp 36.7 °C (98 °F) (Temporal)   Resp (!) 24   Ht 1.798 m (5' 10.8\")   Wt 77.1 kg (170 lb)   SpO2 99%   BMI 23.84 kg/m²   Gen: Alert, no acute distress  HEENT: ATNC, normal oropharynx, nasal congestion present  Eyes: PERRL, EOMI, bilateral conjunctival injection  Neck: trachea midline  Resp: Mild respiratory distress.  Poor air exchange bilaterally.  Bilateral wheezes.  No crackles.  CV: No JVD, regular rate and rhythm  Abd: non-distended, soft, nontender  Ext: No deformities  Neuro: speech fluent    DIAGNOSTIC STUDIES / PROCEDURES  EKG  Results for orders placed or performed during the hospital encounter of 23   EKG (NOW)   Result Value Ref Range    Report       [facility] Emergency Dept.    Test Date:  2023  Pt Name:    KJ GARCÍA             Department: [facility]  MRN:        [id number]                      [facility]   Gender:     Female                       Technician: [id number]  :        1990                   Requested By:MAURICIO CHAKRABORTY  Order #:    [id number]                    Reading MD: Mauricio Chakraborty    Measurements  Intervals                                Axis  Rate:       97                           P:          60  MN:         127                          QRS:        34  QRSD:       100                          T:  "         46  QT:         341  QTc:        433    Interpretive Statements  Sinus rhythm  No previous ECG available for comparison  Electronically Signed On 4- 1:23:46 PDT by Anuj Chakraborty         LABS  Labs Reviewed   CBC WITH DIFFERENTIAL - Abnormal; Notable for the following components:       Result Value    Immature Granulocytes 1.30 (*)     All other components within normal limits   COMP METABOLIC PANEL   HCG QUAL SERUM   CORRECTED CALCIUM   ESTIMATED GFR         RADIOLOGY  I have independently interpreted the diagnostic imaging associated with this visit:  Chest x-ray: No pneumonia    DX-CHEST-PORTABLE (1 VIEW)   Final Result         1.  No acute cardiopulmonary disease.          COURSE & MEDICAL DECISION MAKING  Pertinent Labs & Imaging studies were reviewed. (See chart for details)    ED Observation Status? Yes  Patient admitted to ED observation at 12:21 AM on 4/22/2023 due to diagnostic uncertainty and therapeutic intensity    Observation plan: Determine response to treatment, follow-up labs    After observation the patient is improved and will be discharged  Patient discharged from ED Observation at 1:22 AM on 4/22/2023    EXTERNAL RECORDS REVIEWED  Outpatient Notes most recent outpatient note 12/17/2022 for otitis media      INITIAL ASSESSMENT AND PLAN  Care Narrative: Patient presents with 3 weeks of nasal congestion thought to be due to allergies with worsening shortness of breath today.  On lung auscultation she has poor air exchange with wheezing bilaterally consistent with what appears to be an asthma exacerbation.  No overt evidence of anaphylaxis.  Will treat with DuoNeb, steroids.  Will perform chest x-ray given the prodrome to ensure no evidence of walking pneumonia, pneumonitis.    Chest x-ray demonstrates no pneumonia.  Patient has improved lung auscultation after breathing treatment.    Possible viral infection causing her asthma exacerbation, however overall clinical picture appears more  consistent with seasonal allergies.    ADDITIONAL PROBLEM LIST AND DISPOSITION  1.  Asthma: New diagnosis, treatment with albuterol, dexamethasone, referral to primary care for follow-up  2.  Seasonal allergies: Already on appropriate second-generation antihistamine and fluticasone.  No evidence of anaphylaxis      Escalation of care considered, and ultimately not performed: acute inpatient care management, however at this time, the patient is most appropriate for outpatient management.     Barriers to care at this time, including but not limited to: Patient does not have established PCP.     Decision tools and prescription drugs considered including, but not limited to: Antibiotics not currently indicated      Patient is referred to primary care provider for blood pressure, diabetes and all other preventative health services.  Patient was given return precautions, anticipatory guidance, and the opportunity ask questions prior to discharge        FINAL IMPRESSION  1. Intermittent asthma with acute exacerbation, unspecified asthma severity           DISPOSITION:  Patient will be discharged home in stable condition.    FOLLOW UP:  To establish a primary care provider within our system, please call [phone]    Schedule an appointment as soon as possible for a visit       [facility], Emergency Dept  [address]  [facility] [address] [phone]    If symptoms worsen      OUTPATIENT MEDICATIONS:  New Prescriptions    ALBUTEROL 108 (90 BASE) MCG/ACT AERO SOLN INHALATION AEROSOL    Inhale 2 Puffs every 6 hours as needed for Shortness of Breath.          This dictation was created using voice recognition software. The accuracy of the dictation is limited to the abilities of the software. I expect there may be some errors of grammar and possibly content. The nursing notes were reviewed and certain aspects of this information were incorporated into this note.

## 2023-04-22 NOTE — ED TRIAGE NOTES
Allergies over the last 3wks  Worse today report increasing SOB & tightness in chest that started pta   Pt took otc allergy medication w/no relief  Speaking short sentences info gathered by spouse

## 2023-05-03 ENCOUNTER — TELEPHONE (OUTPATIENT)
Dept: HEALTH INFORMATION MANAGEMENT | Facility: OTHER | Age: 33
End: 2023-05-03
Payer: COMMERCIAL

## 2025-02-05 ENCOUNTER — APPOINTMENT (OUTPATIENT)
Dept: URBAN - METROPOLITAN AREA CLINIC 6 | Facility: CLINIC | Age: 35
Setting detail: DERMATOLOGY
End: 2025-02-05

## 2025-02-05 DIAGNOSIS — D22 MELANOCYTIC NEVI: ICD-10-CM

## 2025-02-05 DIAGNOSIS — Z71.89 OTHER SPECIFIED COUNSELING: ICD-10-CM

## 2025-02-05 DIAGNOSIS — D485 NEOPLASM OF UNCERTAIN BEHAVIOR OF SKIN: ICD-10-CM

## 2025-02-05 PROBLEM — D48.5 NEOPLASM OF UNCERTAIN BEHAVIOR OF SKIN: Status: ACTIVE | Noted: 2025-02-05

## 2025-02-05 PROCEDURE — 11103 TANGNTL BX SKIN EA SEP/ADDL: CPT

## 2025-02-05 PROCEDURE — 11102 TANGNTL BX SKIN SINGLE LES: CPT

## 2025-02-05 PROCEDURE — ? BIOPSY BY SHAVE METHOD

## 2025-02-05 PROCEDURE — ? COUNSELING

## 2025-02-05 PROCEDURE — 99212 OFFICE O/P EST SF 10 MIN: CPT | Mod: 25

## 2025-02-05 ASSESSMENT — LOCATION ZONE DERM
LOCATION ZONE: TRUNK
LOCATION ZONE: NECK

## 2025-02-05 ASSESSMENT — LOCATION DETAILED DESCRIPTION DERM
LOCATION DETAILED: LEFT SUPERIOR UPPER BACK
LOCATION DETAILED: RIGHT SUPERIOR LATERAL NECK

## 2025-02-05 ASSESSMENT — LOCATION SIMPLE DESCRIPTION DERM
LOCATION SIMPLE: RIGHT ANTERIOR NECK
LOCATION SIMPLE: LEFT UPPER BACK

## 2025-02-05 NOTE — PROCEDURE: BIOPSY BY SHAVE METHOD

## 2025-02-05 NOTE — PROCEDURE: MIPS QUALITY
Quality 226: Preventive Care And Screening: Tobacco Use: Screening And Cessation Intervention: Patient screened for tobacco use and is an ex/non-smoker
Quality 431: Preventive Care And Screening: Unhealthy Alcohol Use - Screening: Patient not identified as an unhealthy alcohol user when screened for unhealthy alcohol use using a systematic screening method
Detail Level: Detailed
Quality 47: Advance Care Plan: Advance Care Planning discussed and documented in the medical record; patient did not wish or was not able to name a surrogate decision maker or provide an advance care plan.
99

## 2025-03-31 ENCOUNTER — OFFICE VISIT (OUTPATIENT)
Dept: URGENT CARE | Facility: CLINIC | Age: 35
End: 2025-03-31
Payer: COMMERCIAL

## 2025-03-31 VITALS
HEIGHT: 68 IN | DIASTOLIC BLOOD PRESSURE: 66 MMHG | RESPIRATION RATE: 16 BRPM | HEART RATE: 85 BPM | WEIGHT: 171 LBS | SYSTOLIC BLOOD PRESSURE: 128 MMHG | TEMPERATURE: 97.5 F | OXYGEN SATURATION: 99 % | BODY MASS INDEX: 25.91 KG/M2

## 2025-03-31 DIAGNOSIS — B96.89 BACTERIAL SINUSITIS: ICD-10-CM

## 2025-03-31 DIAGNOSIS — J32.9 BACTERIAL SINUSITIS: ICD-10-CM

## 2025-03-31 RX ORDER — PREDNISONE 20 MG/1
40 TABLET ORAL DAILY
Qty: 10 TABLET | Refills: 0 | Status: SHIPPED | OUTPATIENT
Start: 2025-03-31 | End: 2025-04-05

## 2025-03-31 RX ORDER — PREDNISONE 20 MG/1
40 TABLET ORAL DAILY
Qty: 10 TABLET | Refills: 0 | Status: SHIPPED | OUTPATIENT
Start: 2025-03-31 | End: 2025-03-31

## 2025-03-31 ASSESSMENT — ENCOUNTER SYMPTOMS
FEVER: 0
COUGH: 1
SINUS PAIN: 1

## 2025-03-31 ASSESSMENT — FIBROSIS 4 INDEX: FIB4 SCORE: 0.61

## 2025-03-31 NOTE — PROGRESS NOTES
Verbal consent was acquired by the patient to use [employer] ambient listening note generation during this visit   Subjective:   Avery Mancini is a 34 y.o. female who presents for Congestion (X1wk, nasal congestion, cough, sore throat, runny nose)      HPI:  History of Present Illness  The patient is a 34-year-old female who presents for evaluation of sinus infection.    She began experiencing congestion last Monday, initially attributing it to allergies. However, the condition progressively worsened over the week, evolving into a cough and sore throat. She also reports postnasal drip. Despite attempts to alleviate the symptoms with allergy medication, cold medicine, and nasal drops, there has been no improvement. She describes a sensation of plugged ears but reports no associated pain. She has used Vicks nasal spray twice. She has not experienced any fevers. She has a history of severe sinus infections.            Review of Systems   Constitutional:  Negative for fever.   HENT:  Positive for congestion and sinus pain.    Respiratory:  Positive for cough.        Medications:    Current Outpatient Medications on File Prior to Visit   Medication Sig Dispense Refill    Phenyleph-Doxylamine-DM-APAP (MINDY-SELTZER PLS NIGHT CLD/FLU PO) Take  by mouth.      valACYclovir (VALTREX) 500 MG Tab TAKE 2 TABLETS BY MOUTH AT FIRST SIGN OF SYMPTOMS AND TAKE 2 TABLETS BY MOUTH 12 HOURS LATER      cetirizine (ZYRTEC) 10 MG Tab Take 1 Tablet by mouth every day. 30 Tablet 0    fluticasone (FLONASE) 50 MCG/ACT nasal spray Administer 1 Spray into affected nostril(S) every day. 16 g 0    albuterol 108 (90 Base) MCG/ACT Aero Soln inhalation aerosol Inhale 2 Puffs every 6 hours as needed for Shortness of Breath. (Patient not taking: Reported on 3/31/2025) 8.5 g 0    ISIBLOOM 0.15-30 MG-MCG per tablet Take 1 Tablet by mouth every day. (Patient not taking: Reported on 3/31/2025)       No current facility-administered medications on  "file prior to visit.        Allergies:   Gluten    Problem List:   Patient Active Problem List   Diagnosis    Celiac disease    Hip pain, chronic    Menorrhagia    Labor and delivery indication for care or intervention        Surgical History:  Past Surgical History:   Procedure Laterality Date    HIP ARTHROSCOPY         Past Social Hx:   Social History     Tobacco Use    Smoking status: Never    Smokeless tobacco: Never   Vaping Use    Vaping status: Never Used   Substance Use Topics    Alcohol use: Yes     Alcohol/week: 0.5 oz     Types: 1 drink(s) per week     Comment: occasionally    Drug use: Yes     Types: Oral     Comment: THC gummies          Problem list, medications, and allergies reviewed by myself today in Epic.     Objective:     /66 (BP Location: Left arm, Patient Position: Sitting, BP Cuff Size: Adult)   Pulse 85   Temp 36.4 °C (97.5 °F) (Temporal)   Resp 16   Ht 1.727 m (5' 8\")   Wt 77.6 kg (171 lb)   SpO2 99%   BMI 26.00 kg/m²     Physical Exam  Vitals and nursing note reviewed.   Constitutional:       General: She is not in acute distress.     Appearance: Normal appearance. She is normal weight. She is not ill-appearing, toxic-appearing or diaphoretic.   HENT:      Head: Normocephalic and atraumatic.      Right Ear: Tympanic membrane, ear canal and external ear normal. There is no impacted cerumen.      Left Ear: Tympanic membrane, ear canal and external ear normal. There is no impacted cerumen.      Nose: Congestion and rhinorrhea present.      Right Turbinates: Swollen.      Left Turbinates: Swollen.      Mouth/Throat:      Mouth: Mucous membranes are moist.      Pharynx: Oropharynx is clear. No oropharyngeal exudate or posterior oropharyngeal erythema.   Cardiovascular:      Rate and Rhythm: Normal rate and regular rhythm.      Pulses: Normal pulses.      Heart sounds: Normal heart sounds. No murmur heard.     No friction rub. No gallop.   Pulmonary:      Effort: Pulmonary effort is " normal. No respiratory distress.      Breath sounds: Normal breath sounds. No stridor. No wheezing, rhonchi or rales.   Chest:      Chest wall: No tenderness.   Musculoskeletal:      Cervical back: Neck supple. No tenderness.   Lymphadenopathy:      Cervical: No cervical adenopathy.   Skin:     General: Skin is warm and dry.      Capillary Refill: Capillary refill takes less than 2 seconds.   Neurological:      General: No focal deficit present.      Mental Status: She is alert and oriented to person, place, and time. Mental status is at baseline.      Cranial Nerves: No cranial nerve deficit.      Motor: No weakness.      Gait: Gait normal.   Psychiatric:         Mood and Affect: Mood normal.         Behavior: Behavior normal.         Thought Content: Thought content normal.         Judgment: Judgment normal.         Assessment/Plan:     Diagnosis and associated orders:   1. Bacterial sinusitis  - amoxicillin-clavulanate (AUGMENTIN) 875-125 MG Tab; Take 1 Tablet by mouth 2 times a day for 7 days.  Dispense: 14 Tablet; Refill: 0  - predniSONE (DELTASONE) 20 MG Tab; Take 2 Tablets by mouth every day for 5 days.  Dispense: 10 Tablet; Refill: 0    Other orders  - Phenyleph-Doxylamine-DM-APAP (MINDY-SELTZER PLS NIGHT CLD/FLU PO); Take  by mouth.        Comments/MDM:   Pt is clinically stable at today's acute urgent care visit.  No acute distress noted. Appropriate for outpatient management at this time.     Assessment & Plan  1. Sinus Infection.  She started having congestion last week, which progressively worsened and developed into a cough and sore throat with postnasal drip. A 1-week course of Augmentin and a 5-day course of prednisone have been prescribed to treat the sinus infection and help relieve symptoms. She is advised to continue using an antihistamine such as Claritin or Zyrtec .           Discussed DDx, management options (risks,benefits, and alternatives to planned treatment), natural progression and  supportive care.  Expressed understanding and the treatment plan was agreed upon. Questions were encouraged and answered   Return to urgent care prn if new or worsening sx or if there is no improvement in condition prn.    Educated in Red flags and indications to immediately call 911 or present to the Emergency Department.   Advised the patient to follow-up with the primary care physician for recheck, reevaluation, and consideration of further management.    I personally reviewed prior external notes and test results pertinent to today's visit.  I have independently reviewed and interpreted all diagnostics ordered during this urgent care acute visit.       Please note that this dictation was created using voice recognition software. I have made a reasonable attempt to correct obvious errors, but I expect that there are errors of grammar and possibly content that I did not discover before finalizing the note.    This note was electronically signed by GARY Fernandez